# Patient Record
Sex: FEMALE | Race: WHITE | NOT HISPANIC OR LATINO | Employment: FULL TIME | URBAN - METROPOLITAN AREA
[De-identification: names, ages, dates, MRNs, and addresses within clinical notes are randomized per-mention and may not be internally consistent; named-entity substitution may affect disease eponyms.]

---

## 2018-06-20 ENCOUNTER — OFFICE VISIT (OUTPATIENT)
Dept: ORTHOPEDICS | Facility: CLINIC | Age: 28
End: 2018-06-20
Payer: COMMERCIAL

## 2018-06-20 VITALS
SYSTOLIC BLOOD PRESSURE: 122 MMHG | BODY MASS INDEX: 23.07 KG/M2 | WEIGHT: 147 LBS | HEART RATE: 60 BPM | DIASTOLIC BLOOD PRESSURE: 78 MMHG | HEIGHT: 67 IN

## 2018-06-20 DIAGNOSIS — M25.561 RECURRENT PAIN OF RIGHT KNEE: Primary | ICD-10-CM

## 2018-06-20 NOTE — MR AVS SNAPSHOT
"              After Visit Summary   2018    Yany Tolliver    MRN: [id number]           Patient Information     Date Of Birth          1990        Visit Information        Provider Department      2018 3:10 PM Anthony Castano MD [facility]        Today's Diagnoses     Recurrent pain of right knee    -  1       Follow-ups after your visit        Future tests that were ordered for you today     Open Future Orders        Priority Expected Expires Ordered    MR Knee Right w/o Contrast Routine  2019            Who to contact     Please call your clinic at [phone] to:    Ask questions about your health    Make or cancel appointments    Discuss your medicines    Learn about your test results    Speak to your doctor            Additional Information About Your Visit        MyChart Information     [employer] is an electronic gateway that provides easy, online access to your medical records. With [employer], you can request a clinic appointment, read your test results, renew a prescription or communicate with your care team.     To sign up for [employer] visit the website at [email or web address]   You will be asked to enter the access code listed below, as well as some personal information. Please follow the directions to create your username and password.     Your access code is: [email or web address]  Expires: 2018  3:42 PM     Your access code will  in 90 days. If you need help or a new code, please contact your [facility] or call [phone] for assistance.        Care EveryWhere ID     This is your Care EveryWhere ID. This could be used by other organizations to access your [facility] medical records  [id number]        Your Vitals Were     Pulse Height BMI (Body Mass Index)             60 5' 7\" (1.702 m) 23.02 kg/m2          Blood Pressure from Last 3 Encounters:   18 122/78    Weight from Last 3 " Encounters:   06/20/18 147 lb (66.7 kg)               Primary Care Provider    None Specified       No primary provider on file.        Equal Access to Services     MEGAN ROSADO : Hadii aad ku hadmicheleesau Nanec, waeldamaxim lariosmartinezha, jazielgeo momattmaxim mirandakandismaxim, ivan samirin hayaajyoti mirandanany yoo laGeoffjimmie higuera. So [facility] [phone].    ATENCIÓN: Si habla español, tiene a evangelista disposición servicios gratuitos de asistencia lingüística. Llame al [phone].    We comply with applicable federal civil rights laws and [place] laws. We do not discriminate on the basis of race, color, national origin, age, disability, sex, sexual orientation, or gender identity.            Thank you!     Thank you for choosing [facility]  for your care. Our goal is always to provide you with excellent care. Hearing back from our patients is one way we can continue to improve our services. Please take a few minutes to complete the written survey that you may receive in the mail after your visit with us. Thank you!             Your Updated Medication List - Protect others around you: Learn how to safely use, store and throw away your medicines at www.disposemymeds.org.      Notice  As of 6/20/2018  3:42 PM    You have not been prescribed any medications.

## 2018-06-20 NOTE — PROGRESS NOTES
SPORTS & ORTHOPEDIC WALK-IN VISIT 6/20/2018    Primary Care Physician:      Around March she was playing in a soccer match and twister her knee. She was told she had an ACL sprain. She did her own PT, iced, and mostly recovered. Last night in a soccer match her twisted her knee/planted her foot and felt a pop in the same knee. She was unable to finish the match and has had pain since then.     Reason for visit:     What part of your body is injured / painful?  right knee    What caused the injury /pain? Recreational/competitive sports injury - Soccer    How long ago did your injury occur or pain begin? yesterday    What are your most bothersome symptoms? Pain    How would you characterize your symptom?  sharp    What makes your symptoms better? Rest    What makes your symptoms worse? Standing, Walking and Bending    Have you been previously seen for this problem? No    Medical History:    Any recent changes to your medical history? No    Any new medication prescribed since last visit? No    Have you had surgery on this body part before? 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 No    Do you have any vision problems? No    Do you have any chest pain or edema? No    Do you have any shortness of breath or wheezing?  No    Do you have stomach problems? No    Do you have any numbness or focal weakness? No    Do you have diabetes? No    Do you have problems with bleeding or clotting? No    Do you have an rashes or other skin lesions? No       Subjective  Yany Tolliver is a 27 year old female who presents with the complaint of R knee pain  Had a ACL sprain in March with ACL injury described as a few fibers in March 2018. She was seen at [facility] and rehab recommended. She did dome home exercises and progressed to soccer before stepping and twisting her R knee with a pop last  "night. She had pain and had to stop. She had minimal swelling and in giving way. She iced it and is here for evaluation.      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       No Known Allergies  No current outpatient prescriptions on file.         Family Hx remarkable for   Yany Tolliver does not use tobacco      ROS:  Constitutional no fevers sweats or chills  Eyes no vision change  Respiratory, no sob cough wheezing,   Cardiovascular no syncope, chest pain or palpitaitons,   Gastroenterology, no nausea, vomiting or abd pain, no stool incontinence  Genitourinary, no dysuria, no  Frequency, no urinary incontinence, no urinary retention  Integumentary-no recent rashes  Musculoskeletal see HPI otherwise negative  Psychiatric no depression or anxiety  Heme-no abnormal bleeding      Objective  /78  Pulse 60  Ht 5' 7\" (1.702 m)  Wt 147 lb (66.7 kg)  BMI 23.02 kg/m2  Gen     right  Knee Exam  small joint effusion, No redness;Tenderness lateral/posterior joint line; ROM flexion limited to 90 deg with pain, Strength 5/5 ext, 5/5 flexion;No pain or opening with varus or valgus stress test; no increased translation with Lachmans; painful  McMurrays painful; unable to Squat test; Neg patellar apprehension test, Neg pseudocompression test, hip IR/ER did not cause pain    Assessment  R knee injury  --exam suggestive of mensical tear or chondral injury.  ACL tear could be considered but little swelling at 24 hours and no increased translation, though painful exam.     Plan  We discussed options including PRICE, ROM and recheck in 2 weeks once pain improves vs. Advanced imaging. She opts to image to know the extent of her injury and would consider all options depending on the injury. I do not believe this is a fx or ACL tear, therefore MRI ordered to evalutate. MRI and then f/u to review results    Anthony Castano MD " CAQ        Anthony Castano MD CAQ

## 2018-06-20 NOTE — LETTER
6/20/2018       RE: Yany Tolliver  [address] [place] [address]     Dear Colleague,    Thank you for referring your patient, Yany Tolliver, to the [facility] at [facility]. Please see a copy of my visit note below.          SPORTS & ORTHOPEDIC WALK-IN VISIT 6/20/2018    Primary Care Physician:      Around March she was playing in a soccer match and twister her knee. She was told she had an ACL sprain. She did her own PT, iced, and mostly recovered. Last night in a soccer match her twisted her knee/planted her foot and felt a pop in the same knee. She was unable to finish the match and has had pain since then.     Reason for visit:     What part of your body is injured / painful?  right knee    What caused the injury /pain? Recreational/competitive sports injury - Soccer    How long ago did your injury occur or pain begin? yesterday    What are your most bothersome symptoms? Pain    How would you characterize your symptom?  sharp    What makes your symptoms better? Rest    What makes your symptoms worse? Standing, Walking and Bending    Have you been previously seen for this problem? No    Medical History:    Any recent changes to your medical history? No    Any new medication prescribed since last visit? No    Have you had surgery on this body part before? 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 No    Do you have any vision problems? No    Do you have any chest pain or edema? No    Do you have any shortness of breath or wheezing?  No    Do you have stomach problems? No    Do you have any numbness or focal weakness? No    Do you have diabetes? No    Do you have problems with bleeding or clotting? No    Do you have an rashes or other skin lesions? No       Subjective  Yany  "Omaira Tolliver is a 27 year old female who presents with the complaint of R knee pain  Had a ACL sprain in March with ACL injury described as a few fibers in March 2018. She was seen at [facility] and rehab recommended. She did dome home exercises and progressed to soccer before stepping and twisting her R knee with a pop last night. She had pain and had to stop. She had minimal swelling and in giving way. She iced it and is here for evaluation.      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       No Known Allergies  No current outpatient prescriptions on file.         Family Hx remarkable for   Yany Tolliver does not use tobacco      ROS:  Constitutional no fevers sweats or chills  Eyes no vision change  Respiratory, no sob cough wheezing,   Cardiovascular no syncope, chest pain or palpitaitons,   Gastroenterology, no nausea, vomiting or abd pain, no stool incontinence  Genitourinary, no dysuria, no  Frequency, no urinary incontinence, no urinary retention  Integumentary-no recent rashes  Musculoskeletal see HPI otherwise negative  Psychiatric no depression or anxiety  Heme-no abnormal bleeding      Objective  /78  Pulse 60  Ht 5' 7\" (1.702 m)  Wt 147 lb (66.7 kg)  BMI 23.02 kg/m2  Gen     right  Knee Exam  small joint effusion, No redness;Tenderness lateral/posterior joint line; ROM flexion limited to 90 deg with pain, Strength 5/5 ext, 5/5 flexion;No pain or opening with varus or valgus stress test; no increased translation with Lachmans; painful  McMurrays painful; unable to Squat test; Neg patellar apprehension test, Neg pseudocompression test, hip IR/ER did not cause pain    Assessment  R knee injury  --exam suggestive of mensical tear or chondral injury.  ACL tear could be considered but little swelling at 24 hours and no increased translation, though painful exam.     Plan  We discussed options including " PRICE, ROM and recheck in 2 weeks once pain improves vs. Advanced imaging. She opts to image to know the extent of her injury and would consider all options depending on the injury. I do not believe this is a fx or ACL tear, therefore MRI ordered to evalutate. MRI and then f/u to review results    Anthony Castano MD CAQ

## 2018-06-28 ENCOUNTER — TELEPHONE (OUTPATIENT)
Dept: ORTHOPEDICS | Facility: CLINIC | Age: 28
End: 2018-06-28

## 2018-06-28 NOTE — TELEPHONE ENCOUNTER
Pt was called back in response to a VM left on the walk-in phone. She wasn't sure where the clinic that she is to see Dr. Castano in on Monday is. I informed her that it is in the same building as the walk-in clinic, but on the fifth floor. She verbalized understanding. We confirmed the time of the appointment. She had no further questions.

## 2018-07-02 ENCOUNTER — OFFICE VISIT (OUTPATIENT)
Dept: ORTHOPEDICS | Facility: CLINIC | Age: 28
End: 2018-07-02
Payer: COMMERCIAL

## 2018-07-02 VITALS — BODY MASS INDEX: 23.07 KG/M2 | RESPIRATION RATE: 16 BRPM | HEIGHT: 67 IN | WEIGHT: 147 LBS

## 2018-07-02 DIAGNOSIS — M25.561 ACUTE PAIN OF RIGHT KNEE: Primary | ICD-10-CM

## 2018-07-02 NOTE — MR AVS SNAPSHOT
After Visit Summary   7/2/2018    Yany Tolliver    MRN: [id number]           Patient Information     Date Of Birth          1990        Visit Information        Provider Department      7/2/2018 5:30 PM Anthony Castano MD [facility]        Today's Diagnoses     Acute pain of right knee    -  1       Follow-ups after your visit        Additional Services     PHYSICAL THERAPY REFERRAL (Internal)       Physical Therapy Referral                  Your next 10 appointments already scheduled     Jul 11, 2018  3:00 PM CDT   (Arrive by 2:45 PM)   ENEIDA Extremity with Jessi Villagomez, PT   [facility] [place] ([facility])    [address] [place] [address] [id number] [phone]            Jul 20, 2018 10:10 AM CDT   ENEIDA Extremity with Tamy Rios PT   [facility] [place] ([facility])    [address] [place] [address] [id number] [phone]            Jul 24, 2018  2:10 PM CDT   ENEIDA Extremity with Tamy Rios, PT   [facility] [place] ([facility])    [address] [place] [address] [id number] [phone]            Aug 01, 2018 12:00 PM CDT   (Arrive by 11:45 AM)   Return Visit with Anthony Castano MD   [facility] ([facility])    [address] [place] [address] [phone]              Who to contact     Please call your clinic at [phone] to:    Ask questions about your health    Make or cancel appointments    Discuss your medicines    Learn about your test results    Speak to your doctor            Additional Information About Your Visit        YouAppihart Information     [employer] is an electronic gateway that provides easy, online access to your medical records. With [employer], you can request a clinic appointment, read  "your test results, renew a prescription or communicate with your care team.     To sign up for "[employer]hart visit the website at [email or web address]   You will be asked to enter the access code listed below, as well as some personal information. Please follow the directions to create your username and password.     Your access code is: [email or web address]  Expires: 2018  3:42 PM     Your access code will  in 90 days. If you need help or a new code, please contact your [facility] or call [phone] for assistance.        Care EveryWhere ID     This is your Care EveryWhere ID. This could be used by other organizations to access your [place] medical records  [id number]        Your Vitals Were     Respirations Height BMI (Body Mass Index)             16 5' 7\" (1.702 m) 23.02 kg/m2          Blood Pressure from Last 3 Encounters:   18 122/78    Weight from Last 3 Encounters:   18 147 lb (66.7 kg)   18 147 lb (66.7 kg)              We Performed the Following     PHYSICAL THERAPY REFERRAL (Internal)        Primary Care Provider    None Specified       No primary provider on file.        Equal Access to Services     JENNA ROSADO : Hadii diya Nance, waeldada mary, qafranciscata kaalmada misael, ivan boogie . So [facility] [phone].    ATENCIÓN: Si habla español, tiene a evangelista disposición servicios gratuitos de asistencia lingüística. Llame al [phone].    We comply with applicable federal civil rights laws and [place] laws. We do not discriminate on the basis of race, color, national origin, age, disability, sex, sexual orientation, or gender identity.            Thank you!     Thank you for choosing [facility]  for your care. Our goal is always to provide you with excellent care. Hearing back from our patients is one way we can continue to improve our services. Please take a few minutes to complete the written " survey that you may receive in the mail after your visit with us. Thank you!             Your Updated Medication List - Protect others around you: Learn how to safely use, store and throw away your medicines at www.disposemymeds.org.      Notice  As of 7/2/2018 11:59 PM    You have not been prescribed any medications.

## 2018-07-02 NOTE — LETTER
Date:July 6, 2018      Patient was self referred, no letter generated. Do not send.        [facility] Information

## 2018-07-02 NOTE — PROGRESS NOTES
" Subjective:   Yany Tolliver is a 27 year old female who is here following up recurrent pain of right knee. She is wanting to try physical therapy.  Her pain has improved. She improved significantly after a few days. She can walk normally and is back to where she was with more pain. Pain onl    Date of injury: 3/18  Date last seen: Visit date not found  Following Therapeutic Plan:  Hasn't done MRI   Pain: Improving  Function: Improving  Interval History:      PAST MEDICAL, SOCIAL, SURGICAL AND FAMILY HISTORY: She  has no past medical history on file.  She  has no past surgical history on file.  Her family history is not on file.  She reports that she has never smoked. She has never used smokeless tobacco.    ALLERGIES: She has No Known Allergies.    CURRENT MEDICATIONS: She currently has no medications in their medication list.     REVIEW OF SYSTEMS: 3 point review of systems is negative except as noted above.     Exam:   Resp 16  Ht 5' 7\" (1.702 m)  Wt 147 lb (66.7 kg)  BMI 23.02 kg/m2        CONSTITUTIONAL: healthy, alert and no distress  HEAD: Normocephalic. No masses, lesions, tenderness or abnormalities  SKIN: no suspicious lesions or rashes  GAIT: normal  NEUROLOGIC: Non-focal  PSYCHIATRIC: affect normal/bright and mentation appears normal.    MUSCULOSKELETAL:     Right Knee Exam  No joint effusion, No redness;Tenderness nontender at the joint lines today;ROM improved with full ext and pain with flexion past 120 deg;   Strength 5/5 ext, 5/5 flexion;No pain or opening with varus or valgus stress test; Neg Lachmans; Neg McMurrays; painful Squat test; Neg patellar apprehension test, Neg pseudocompression test, hip IR/ER did not cause pain  Able to single leg stand and single leg hop       Assessment/Plan:   R knee injury  -- initial pop and pain without effusion  -- previous hx of low grade ACL sprain March 2018. She under-rehabbed this and then reinjured the knee. Differential was incomplete resprain " vs. mensical tear or pf pain and subluxation with pop.    --stable on exam, some medial pain with flexion. We discussed option of MRI for more knowledge but she declines in part due to cost and in part because her knee is quickly improving.  We discussed PT referral for ROM and strengthening and progression from walking to jogging to straight ahead running and lifting no more than body weight. Recheck in 1 month for progression. Can call for R knee MRI if symptoms worsen or swelling.    Anthony Castano MD CAQ

## 2018-07-02 NOTE — LETTER
"  7/2/2018      RE: Yany Tolliver  [address] [place] [address]        Subjective:   Yany Tolliver is a 27 year old female who is here following up recurrent pain of right knee. She is wanting to try physical therapy.  Her pain has improved. She improved significantly after a few days. She can walk normally and is back to where she was with more pain. Pain onl    Date of injury: 3/18  Date last seen: Visit date not found  Following Therapeutic Plan:  Hasn't done MRI   Pain: Improving  Function: Improving  Interval History:      PAST MEDICAL, SOCIAL, SURGICAL AND FAMILY HISTORY: She  has no past medical history on file.  She  has no past surgical history on file.  Her family history is not on file.  She reports that she has never smoked. She has never used smokeless tobacco.    ALLERGIES: She has No Known Allergies.    CURRENT MEDICATIONS: She currently has no medications in their medication list.     REVIEW OF SYSTEMS: 3 point review of systems is negative except as noted above.     Exam:   Resp 16  Ht 5' 7\" (1.702 m)  Wt 147 lb (66.7 kg)  BMI 23.02 kg/m2        CONSTITUTIONAL: healthy, alert and no distress  HEAD: Normocephalic. No masses, lesions, tenderness or abnormalities  SKIN: no suspicious lesions or rashes  GAIT: normal  NEUROLOGIC: Non-focal  PSYCHIATRIC: affect normal/bright and mentation appears normal.    MUSCULOSKELETAL:     Right Knee Exam  No joint effusion, No redness;Tenderness nontender at the joint lines today;ROM improved with full ext and pain with flexion past 120 deg;   Strength 5/5 ext, 5/5 flexion;No pain or opening with varus or valgus stress test; Neg Lachmans; Neg McMurrays; painful Squat test; Neg patellar apprehension test, Neg pseudocompression test, hip IR/ER did not cause pain  Able to single leg stand and single leg hop       Assessment/Plan:   R knee injury  -- initial pop and pain without effusion  -- previous hx of low grade ACL sprain March 2018. " She under-rehabbed this and then reinjured the knee. Differential was incomplete resprain vs. mensical tear or pf pain and subluxation with pop.    --stable on exam, some medial pain with flexion. We discussed option of MRI for more knowledge but she declines in part due to cost and in part because her knee is quickly improving.  We discussed PT referral for ROM and strengthening and progression from walking to jogging to straight ahead running and lifting no more than body weight. Recheck in 1 month for progression. Can call for R knee MRI if symptoms worsen or swelling.    Anthony Castano MD CAQ      Anthony Castano MD

## 2018-07-11 ENCOUNTER — THERAPY VISIT (OUTPATIENT)
Dept: PHYSICAL THERAPY | Facility: CLINIC | Age: 28
End: 2018-07-11
Payer: COMMERCIAL

## 2018-07-11 DIAGNOSIS — M25.561 RIGHT KNEE PAIN: Primary | ICD-10-CM

## 2018-07-11 PROCEDURE — 97110 THERAPEUTIC EXERCISES: CPT | Mod: GP | Performed by: PHYSICAL THERAPIST

## 2018-07-11 PROCEDURE — 97161 PT EVAL LOW COMPLEX 20 MIN: CPT | Mod: GP | Performed by: PHYSICAL THERAPIST

## 2018-07-11 ASSESSMENT — ACTIVITIES OF DAILY LIVING (ADL)
HOW_WOULD_YOU_RATE_THE_OVERALL_FUNCTION_OF_YOUR_KNEE_DURING_YOUR_USUAL_DAILY_ACTIVITIES?: NOT ANSWERED
AS_A_RESULT_OF_YOUR_KNEE_INJURY,_HOW_WOULD_YOU_RATE_YOUR_CURRENT_LEVEL_OF_DAILY_ACTIVITY?: NOT ANSWERED
SIT WITH YOUR KNEE BENT: NOT ANSWERED
RISE FROM A CHAIR: NOT ANSWERED
STAND: NOT ANSWERED
KNEEL ON THE FRONT OF YOUR KNEE: NOT ANSWERED
SQUAT: NOT ANSWERED
GIVING WAY, BUCKLING OR SHIFTING OF KNEE: I HAVE THE SYMPTOM BUT IT DOES NOT AFFECT MY ACTIVITY
PAIN: THE SYMPTOM AFFECTS MY ACTIVITY SLIGHTLY
LIMPING: I HAVE THE SYMPTOM BUT IT DOES NOT AFFECT MY ACTIVITY
GO DOWN STAIRS: NOT ANSWERED
KNEE_ACTIVITY_OF_DAILY_LIVING_SUM: 22
GO UP STAIRS: NOT ANSWERED
WALK: NOT ANSWERED
WEAKNESS: THE SYMPTOM AFFECTS MY ACTIVITY MODERATELY
SWELLING: I DO NOT HAVE THE SYMPTOM
STIFFNESS: I DO NOT HAVE THE SYMPTOM

## 2018-07-11 NOTE — MR AVS SNAPSHOT
After Visit Summary   7/11/2018    Yany Tolliver    MRN: [id number]           Patient Information     Date Of Birth          1990        Visit Information        Provider Department      7/11/2018 3:00 PM Jessi Villagomez PT [facility] [place]        Today's Diagnoses     Right knee pain    -  1       Follow-ups after your visit        Your next 10 appointments already scheduled     Jul 24, 2018  2:10 PM CDT   ENE[place] Extremity with Tamy Rios PT   [facility] [place] ([facility])    [address] [place] [id number] [phone]            Jul 31, 2018  3:30 PM CDT   ENEIDA Extremity with Tamy Rios PT   [facility] [place] ([facility])    [address] [place] [id number] [phone]            Aug 01, 2018 12:00 PM CDT   (Arrive by 11:45 AM)   Return Visit with Anthony Castano MD   [facility] ([facility])    [address] [place] [id number] [phone]              Who to contact     If you have questions or need follow up information about today's clinic visit or your schedule please contact [facility] PHYSICAL THERAPY [place] directly at [phone].  Normal or non-critical lab and imaging results will be communicated to you by MyChart, letter or phone within 4 business days after the clinic has received the results. If you do not hear from us within 7 days, please contact the clinic through MyChart or phone. If you have a critical or abnormal lab result, we will notify you by phone as soon as possible.  Submit refill requests through [employer] or call your pharmacy and they will forward the refill request to us. Please allow 3 business days for your refill to be completed.          Additional Information About Your Visit        MyChart Information     [employer] lets you  "send messages to your doctor, view your test results, renew your prescriptions, schedule appointments and more. To sign up, go to [email or web address] . Click on \"Log in\" on the left side of the screen, which will take you to the Welcome page. Then click on \"Sign up Now\" on the right side of the page.     You will be asked to enter the access code listed below, as well as some personal information. Please follow the directions to create your username and password.     Your access code is: [email or web address]  Expires: 2018  3:42 PM     Your access code will  in 90 days. If you need help or a new code, please call your [place] clinic or [phone].        Care EveryWhere ID     This is your Care EveryWhere ID. This could be used by other organizations to access your [place] medical records  [id number]         Blood Pressure from Last 3 Encounters:   18 122/78    Weight from Last 3 Encounters:   18 66.7 kg (147 lb)   18 66.7 kg (147 lb)              We Performed the Following     HC PT EVAL, LOW COMPLEXITY     [place] INITIAL EVAL REPORT     THERAPEUTIC EXERCISES        Primary Care Provider Fax #    Physician No Ref-Primary [phone]       No address on file        Equal Access to Services     MEGAN ROSADO : Sabrina Nance, waaxda luqadaha, qaybta kaalmada adeegyada, ivan boogie . So [facility] [phone].    ATENCIÓN: Si habla español, tiene a evangelista disposición servicios gratuitos de asistencia lingüística. Llame al [phone].    We comply with applicable federal civil rights laws and [place] laws. We do not discriminate on the basis of race, color, national origin, age, disability, sex, sexual orientation, or gender identity.            Thank you!     Thank you for choosing [facility] PHYSICAL THERAPY [place]  for your care. Our goal is always to provide you with excellent care. Hearing back from our patients is one way we can continue to improve our " services. Please take a few minutes to complete the written survey that you may receive in the mail after your visit with us. Thank you!             Your Updated Medication List - Protect others around you: Learn how to safely use, store and throw away your medicines at www.disposemymeds.org.      Notice  As of 7/11/2018  4:39 PM    You have not been prescribed any medications.

## 2018-07-11 NOTE — PROGRESS NOTES
[facility] Initial Evaluation  Subjective:  Patient is a 27 year old female presenting with rehab right knee hpi. The history is provided by the patient. No  was used.   Yany Tolliver is a 27 year old female with a right knee condition.      This is a new condition  Pt injured her R knee while playing soccer in march; pt twisted and landed wrong on her foot, she was diagnosed mild ACL tear, she performed exercises which helped, went back to play in June and reinjured her knee, she was playing with her brace on when she twisted it and felt a pop, mild swelling after a few hours; pt iced and rested it all week which helped; on 7/2/18 MD recommended PT ?meniscal injury  .    Patient reports pain:  Posterior and medial.    Pain is described as aching and is intermittent and reported as 2/10.  Associated symptoms:  Loss of strength and buckling/giving out. Pain is worse during the day.  Symptoms are exacerbated by walking and bending/squatting (walknig ariana 3/4 of a mile) and relieved by ice.  Since onset symptoms are gradually improving.        General health as reported by patient is good.  Pertinent medical history includes:  Anemia, history of fractures and migraines/headaches.        Current occupation is barista  .    Primary job tasks include:  Prolonged standing.                                Objective:  System                                           Hip Evaluation    Hip Strength:    Flexion:   Right: 4+/5   Pain:                    Extension:  Left: 5-/5  Pain:Right: 4/5    Pain:    Abduction:  Left: 4+/5     Pain:Right: 4/5    Pain:      External Rotation:  Left: 5-/5   Pain:  Right: 4+/5   Pain:                     Knee Evaluation:  ROM:    AROM    Hyperextension:  Left:  0    Right: 0  Extension:  Left: 0    Right:  0  Flexion: Left: wnl    Right: min loss  PROM    Hyperextension: Left: 20    Right:  10  Extension: Left: 0    Right:  0  Flexion: Left: 140     Right:  130      Strength:     Extension:  Left: 5/5   Pain:      Right: 5/5   Pain:  Flexion:  Left: 5/5   Pain:      Right: 5/5   Pain:    Quad Set Left: WNL    Pain:   Quad Set Right: WNL    Pain:      Palpation:      Right knee tenderness present at:  Medial Joint Line and Semitendinosus      Functional Testing:          Quad:    Single Leg Squat:  Left:         Mild loss of control and femoral IR  Right:      Painful  Moderate loss of control and femoral IR  Bilateral Leg Squat:  R side pain  Normal control              General     ROS    Assessment/Plan:    Patient is a 27 year old female with right side knee complaints.    Patient has the following significant findings with corresponding treatment plan.                Diagnosis 1:  R knee pain   Pain -  hot/cold therapy, self management, education, directional preference exercise and home program  Decreased ROM/flexibility - manual therapy, therapeutic exercise and therapeutic activity  Decreased joint mobility - manual therapy, therapeutic exercise and home program  Decreased strength - therapeutic exercise, therapeutic activities and home program  Decreased proprioception - neuro re-education and therapeutic activities  Decreased function - therapeutic activities and home program    Therapy Evaluation Codes:   1) History comprised of:   Personal factors that impact the plan of care:      None.    Comorbidity factors that impact the plan of care are:      Migraines/headaches.     Medications impacting care: None.  2) Examination of Body Systems comprised of:   Body structures and functions that impact the plan of care:      Knee.   Activity limitations that impact the plan of care are:      Squatting/kneeling.  3) Clinical presentation characteristics are:   Stable/Uncomplicated.  4) Decision-Making    Low complexity using standardized patient assessment instrument and/or measureable assessment of functional outcome.  Cumulative Therapy Evaluation is: Low  complexity.    Previous and current functional limitations:  (See Goal Flow Sheet for this information)    Short term and Long term goals: (See Goal Flow Sheet for this information)     Communication ability:  Patient appears to be able to clearly communicate and understand verbal and written communication and follow directions correctly.  Treatment Explanation - The following has been discussed with the patient:   RX ordered/plan of care  Anticipated outcomes  Possible risks and side effects  This patient would benefit from PT intervention to resume normal activities.   Rehab potential is excellent.    Frequency:  3 X a month, once daily  Duration:  for 2 months  Discharge Plan:  Achieve all LTG.  Independent in home treatment program.  Return to previous functional level by discharge.  Reach maximal therapeutic benefit.    Please refer to the daily flowsheet for treatment today, total treatment time and time spent performing 1:1 timed codes.

## 2018-07-24 ENCOUNTER — THERAPY VISIT (OUTPATIENT)
Dept: PHYSICAL THERAPY | Facility: CLINIC | Age: 28
End: 2018-07-24
Payer: COMMERCIAL

## 2018-07-24 DIAGNOSIS — M25.561 RIGHT KNEE PAIN, UNSPECIFIED CHRONICITY: ICD-10-CM

## 2018-07-24 PROCEDURE — 97110 THERAPEUTIC EXERCISES: CPT | Mod: GP | Performed by: PHYSICAL THERAPIST

## 2018-07-24 PROCEDURE — 97140 MANUAL THERAPY 1/> REGIONS: CPT | Mod: GP | Performed by: PHYSICAL THERAPIST

## 2018-07-24 PROCEDURE — 97530 THERAPEUTIC ACTIVITIES: CPT | Mod: GP | Performed by: PHYSICAL THERAPIST

## 2018-07-31 ENCOUNTER — THERAPY VISIT (OUTPATIENT)
Dept: PHYSICAL THERAPY | Facility: CLINIC | Age: 28
End: 2018-07-31
Payer: COMMERCIAL

## 2018-07-31 DIAGNOSIS — M25.561 RIGHT KNEE PAIN, UNSPECIFIED CHRONICITY: ICD-10-CM

## 2018-07-31 PROCEDURE — 97140 MANUAL THERAPY 1/> REGIONS: CPT | Mod: GP | Performed by: PHYSICAL THERAPIST

## 2018-07-31 PROCEDURE — 97530 THERAPEUTIC ACTIVITIES: CPT | Mod: GP | Performed by: PHYSICAL THERAPIST

## 2018-08-16 ENCOUNTER — THERAPY VISIT (OUTPATIENT)
Dept: PHYSICAL THERAPY | Facility: CLINIC | Age: 28
End: 2018-08-16
Payer: COMMERCIAL

## 2018-08-16 DIAGNOSIS — M25.561 RIGHT KNEE PAIN, UNSPECIFIED CHRONICITY: ICD-10-CM

## 2018-08-16 PROCEDURE — 97530 THERAPEUTIC ACTIVITIES: CPT | Mod: GP | Performed by: PHYSICAL THERAPIST

## 2018-08-16 PROCEDURE — 97140 MANUAL THERAPY 1/> REGIONS: CPT | Mod: GP | Performed by: PHYSICAL THERAPIST

## 2018-08-16 ASSESSMENT — ACTIVITIES OF DAILY LIVING (ADL)
STAND: ACTIVITY IS NOT DIFFICULT
SWELLING: I DO NOT HAVE THE SYMPTOM
PAIN: THE SYMPTOM AFFECTS MY ACTIVITY SLIGHTLY
STIFFNESS: I DO NOT HAVE THE SYMPTOM
GO DOWN STAIRS: ACTIVITY IS NOT DIFFICULT
GO UP STAIRS: ACTIVITY IS NOT DIFFICULT
WALK: ACTIVITY IS NOT DIFFICULT
KNEEL ON THE FRONT OF YOUR KNEE: ACTIVITY IS FAIRLY DIFFICULT
KNEE_ACTIVITY_OF_DAILY_LIVING_SUM: 63
RAW_SCORE: 63
LIMPING: I DO NOT HAVE THE SYMPTOM
SIT WITH YOUR KNEE BENT: ACTIVITY IS NOT DIFFICULT
GIVING WAY, BUCKLING OR SHIFTING OF KNEE: I DO NOT HAVE THE SYMPTOM
AS_A_RESULT_OF_YOUR_KNEE_INJURY,_HOW_WOULD_YOU_RATE_YOUR_CURRENT_LEVEL_OF_DAILY_ACTIVITY?: NORMAL
HOW_WOULD_YOU_RATE_THE_OVERALL_FUNCTION_OF_YOUR_KNEE_DURING_YOUR_USUAL_DAILY_ACTIVITIES?: NEARLY NORMAL
KNEE_ACTIVITY_OF_DAILY_LIVING_SCORE: 90
WEAKNESS: THE SYMPTOM AFFECTS MY ACTIVITY SLIGHTLY
RISE FROM A CHAIR: ACTIVITY IS NOT DIFFICULT
HOW_WOULD_YOU_RATE_THE_CURRENT_FUNCTION_OF_YOUR_KNEE_DURING_YOUR_USUAL_DAILY_ACTIVITIES_ON_A_SCALE_FROM_0_TO_100_WITH_100_BEING_YOUR_LEVEL_OF_KNEE_FUNCTION_PRIOR_TO_YOUR_INJURY_AND_0_BEING_THE_INABILITY_TO_PERFORM_ANY_OF_YOUR_USUAL_DAILY_ACTIVITIES?: 90
SQUAT: ACTIVITY IS NOT DIFFICULT

## 2018-08-30 ENCOUNTER — THERAPY VISIT (OUTPATIENT)
Dept: PHYSICAL THERAPY | Facility: CLINIC | Age: 28
End: 2018-08-30
Payer: COMMERCIAL

## 2018-08-30 DIAGNOSIS — M25.561 RIGHT KNEE PAIN, UNSPECIFIED CHRONICITY: ICD-10-CM

## 2018-08-30 PROCEDURE — 97112 NEUROMUSCULAR REEDUCATION: CPT | Mod: GP | Performed by: PHYSICAL THERAPIST

## 2018-08-30 PROCEDURE — 97110 THERAPEUTIC EXERCISES: CPT | Mod: GP | Performed by: PHYSICAL THERAPIST

## 2018-08-30 PROCEDURE — 97530 THERAPEUTIC ACTIVITIES: CPT | Mod: GP | Performed by: PHYSICAL THERAPIST

## 2018-08-30 NOTE — PROGRESS NOTES
2D Video Running Gait Analysis   Reproduction of  Sports [facility] 2D Video Analysis    Alex Sher PT, PhD 2015     SAGITTAL PLANE OBSERVATIONS  Variable Right Left   Foot Strike Pattern Heel strike Heel strike   IC Tibial Inclination Angle (within 5  of vertical) Mild inclination Mild inclination   IC KF Angle (~20 ) Mild decrease Mild decrease   MS KF Angle (~40 ) Decreased Decreased   MS Ankle DF Angle   (knee over toes) Appropriate Appropriate   Push-off Hip Ext Angle (0-5 ) Appropriate Appropriate   Anterior Pelvic Tilt (5-10 ) Appropriate Appropriate   Lumbar Lordosis Appropriate Appropriate   COM Excursion (6-8 cm) Appropriate Appropriate     Forward Trunk Lean (5-10  forward) Decreased       FRONTAL PLANE OBSERVATIONS  Variable Right Left   Trunk Side Bend (vertical) Appropriate Appropriate   Lateral Pelvic Drop   (males 3-5 , females 4-7 ) Appropriate Excessive contralateral   Knee Center Position (midline) Mild medial Appropriate   Knee Separation   (slight separation) Narrow Narrow   Foot-COM Position   (speed dependent) Appropriate Appropriate   Rearfoot Position   (neutral or mild pronation) Pronated Pronated   Forefoot Position   (neutral or mild abduction) Appropriate Appropriate     Other Observations  Trunk Rotation Appropriate   Arm Swing Right arm held more in abduction   Heel Height (symmetrical) Appropriate     Harmony: 160  Symmetrical sound of loading

## 2018-08-30 NOTE — MR AVS SNAPSHOT
After Visit Summary   8/30/2018    Yany Tolliver    MRN: [id number]           Patient Information     Date Of Birth          1990        Visit Information        Provider Department      8/30/2018 11:00 AM Tamy Rios PT [facility] [place]        Today's Diagnoses     Right knee pain, unspecified chronicity           Follow-ups after your visit        Your next 10 appointments already scheduled     Sep 18, 2018  1:30 PM CDT   [facility] Extremity with [facility] ([facility])    [address] [place] [address] [phone]            Oct 03, 2018 10:50 AM CDT   ENEIDA Extremity with CAREN Garcia [facility] [place] ([facility])    [address] [place] [address] [phone]              Who to contact     If you have questions or need follow up information about today's clinic visit or your schedule please contact [facility] PHYSICAL THERAPY [place] directly at [phone].  Normal or non-critical lab and imaging results will be communicated to you by MyChart, letter or phone within 4 business days after the clinic has received the results. If you do not hear from us within 7 days, please contact the clinic through MyChart or phone. If you have a critical or abnormal lab result, we will notify you by phone as soon as possible.  Submit refill requests through [employer] or call your pharmacy and they will forward the refill request to us. Please allow 3 business days for your refill to be completed.          Additional Information About Your Visit        Care EveryWhere ID     This is your Care EveryWhere ID. This could be used by other organizations to access your [place] medical records  [id number]         Blood Pressure from Last 3 Encounters:   06/20/18 122/78    Weight from Last 3 Encounters:   07/02/18 66.7 kg  (147 lb)   06/20/18 66.7 kg (147 lb)              We Performed the Following     NEUROMUSCULAR RE-EDUCATION     THERAPEUTIC ACTIVITIES     THERAPEUTIC EXERCISES        Primary Care Provider Fax #    Physician No Ref-Primary [phone]       No address on file        Equal Access to Services     MEGAN ALONZO : Sabrina keane bk saad Nance, waaxda luqadaha, qaybta kaalmada adewalt, ivan yoo laGeoffjimmie higuera. So [facility] [phone].    ATENCIÓN: Si habla español, tiene a evangelista disposición servicios gratuitos de asistencia lingüística. Llame al [phone].    We comply with applicable federal civil rights laws and [place] laws. We do not discriminate on the basis of race, color, national origin, age, disability, sex, sexual orientation, or gender identity.            Thank you!     Thank you for choosing [facility] PHYSICAL THERAPY [facility]  for your care. Our goal is always to provide you with excellent care. Hearing back from our patients is one way we can continue to improve our services. Please take a few minutes to complete the written survey that you may receive in the mail after your visit with us. Thank you!             Your Updated Medication List - Protect others around you: Learn how to safely use, store and throw away your medicines at www.disposemymeds.org.      Notice  As of 8/30/2018  2:21 PM    You have not been prescribed any medications.

## 2018-09-18 ENCOUNTER — THERAPY VISIT (OUTPATIENT)
Dept: PHYSICAL THERAPY | Facility: CLINIC | Age: 28
End: 2018-09-18
Payer: COMMERCIAL

## 2018-09-18 DIAGNOSIS — M25.561 RIGHT KNEE PAIN, UNSPECIFIED CHRONICITY: ICD-10-CM

## 2018-09-18 PROCEDURE — 97110 THERAPEUTIC EXERCISES: CPT | Mod: GP | Performed by: PHYSICAL THERAPIST

## 2018-09-18 PROCEDURE — 97112 NEUROMUSCULAR REEDUCATION: CPT | Mod: GP | Performed by: PHYSICAL THERAPIST

## 2018-10-03 ENCOUNTER — THERAPY VISIT (OUTPATIENT)
Dept: PHYSICAL THERAPY | Facility: CLINIC | Age: 28
End: 2018-10-03
Payer: COMMERCIAL

## 2018-10-03 DIAGNOSIS — M25.561 RIGHT KNEE PAIN, UNSPECIFIED CHRONICITY: ICD-10-CM

## 2018-10-03 PROCEDURE — 97112 NEUROMUSCULAR REEDUCATION: CPT | Mod: GP | Performed by: PHYSICAL THERAPIST

## 2018-10-03 PROCEDURE — 97110 THERAPEUTIC EXERCISES: CPT | Mod: GP | Performed by: PHYSICAL THERAPIST

## 2018-10-03 PROCEDURE — 97530 THERAPEUTIC ACTIVITIES: CPT | Mod: GP | Performed by: PHYSICAL THERAPIST

## 2018-10-23 ENCOUNTER — THERAPY VISIT (OUTPATIENT)
Dept: PHYSICAL THERAPY | Facility: CLINIC | Age: 28
End: 2018-10-23
Payer: COMMERCIAL

## 2018-10-23 DIAGNOSIS — M25.561 RIGHT KNEE PAIN, UNSPECIFIED CHRONICITY: ICD-10-CM

## 2018-10-23 PROCEDURE — 97530 THERAPEUTIC ACTIVITIES: CPT | Mod: GP | Performed by: PHYSICAL THERAPIST

## 2018-10-23 PROCEDURE — 97110 THERAPEUTIC EXERCISES: CPT | Mod: GP | Performed by: PHYSICAL THERAPIST

## 2018-12-07 ENCOUNTER — THERAPY VISIT (OUTPATIENT)
Dept: PHYSICAL THERAPY | Facility: CLINIC | Age: 28
End: 2018-12-07
Payer: COMMERCIAL

## 2018-12-07 DIAGNOSIS — M25.561 RIGHT KNEE PAIN, UNSPECIFIED CHRONICITY: Primary | ICD-10-CM

## 2018-12-07 PROCEDURE — 97140 MANUAL THERAPY 1/> REGIONS: CPT | Mod: GP | Performed by: PHYSICAL THERAPIST

## 2018-12-07 PROCEDURE — 97530 THERAPEUTIC ACTIVITIES: CPT | Mod: GP | Performed by: PHYSICAL THERAPIST

## 2018-12-07 NOTE — MR AVS SNAPSHOT
After Visit Summary   12/7/2018    Yany Tolliver    MRN: [id number]           Patient Information     Date Of Birth          1990        Visit Information        Provider Department      12/7/2018 11:30 AM Tamy Rios PT [facility] Therapy [place]        Today's Diagnoses     Right knee pain, unspecified chronicity    -  1       Follow-ups after your visit        Your next 10 appointments already scheduled     Dec 26, 2018  2:40 PM CST   ENEIDA Extremity with CAREN Garcia [facility] [place] ([facility])    [address] [place] [address] [phone]              Who to contact     If you have questions or need follow up information about today's clinic visit or your schedule please contact [facility] PHYSICAL THERAPY [place] directly at [phone].  Normal or non-critical lab and imaging results will be communicated to you by MyChart, letter or phone within 4 business days after the clinic has received the results. If you do not hear from us within 7 days, please contact the clinic through MyChart or phone. If you have a critical or abnormal lab result, we will notify you by phone as soon as possible.  Submit refill requests through [employer] or call your pharmacy and they will forward the refill request to us. Please allow 3 business days for your refill to be completed.          Additional Information About Your Visit        Care EveryWhere ID     This is your Care EveryWhere ID. This could be used by other organizations to access your [place] medical records  [id number]         Blood Pressure from Last 3 Encounters:   06/20/18 122/78    Weight from Last 3 Encounters:   07/02/18 66.7 kg (147 lb)   06/20/18 66.7 kg (147 lb)              We Performed the Following     MANUAL THER TECH,1+REGIONS,EA 15 MIN     THERAPEUTIC ACTIVITIES        Primary Care Provider Fax #    Physician No Ref-Primary [phone]        No address on file        Equal Access to Services     [facility] ALONZO : Hadii aad ku hadmicheleesau Emilykeith, kimmaxim lariosmartinezha, jazielgeo momattmaxim douglas, ivan higuera. So [facility] [phone].    ATENCIÓN: Si habla español, tiene a eavngelista disposición servicios gratuitos de asistencia lingüística. Llame al [phone].    We comply with applicable federal civil rights laws and [place] laws. We do not discriminate on the basis of race, color, national origin, age, disability, sex, sexual orientation, or gender identity.            Thank you!     Thank you for choosing [facility] PHYSICAL THERAPY [facility]  for your care. Our goal is always to provide you with excellent care. Hearing back from our patients is one way we can continue to improve our services. Please take a few minutes to complete the written survey that you may receive in the mail after your visit with us. Thank you!             Your Updated Medication List - Protect others around you: Learn how to safely use, store and throw away your medicines at www.disposemymeds.org.      Notice  As of 12/7/2018  1:02 PM    You have not been prescribed any medications.

## 2018-12-07 NOTE — PROGRESS NOTES
Subjective:  HPI                    Objective:  System    Physical Exam    General     ROS    Assessment/Plan:    PROGRESS  REPORT    Progress reporting period is from 10/23/2018 to 12/7/2018.       SUBJECTIVE  Subjective changes noted by patient:  Giana was able to return fully to soccer and approximately 1 month ago was playing in a game and was hit on the outside of her foot (same mechanism as previous injury) and had onset of knee pain again. She notes that the pain is nearly gone except at end range flexion. She returns today to ensure that there is no injury to her meniscus and discuss her return to soccer again. She has minimal functional impairments. She reports high anxiety regarding returning to soccer.       Current pain level is 0/10  .     Previous pain level was 4/10   Changes in function:  Yes (See Goal flowsheet attached for changes in current functional level)  Adverse reaction to treatment or activity: None    OBJECTIVE  Changes noted in objective findings:  Posterior pain at end range flexion with over pressure. Full extension without pain.   No pain with resisted HS testing or gastroc testing.   No effusion. TTP over medial joint line   Negative meniscal testing (Apley's or thesaly's).         ASSESSMENT/PLAN  Updated problem list and treatment plan: Diagnosis 1:  Knee pain  Pain -  home program  Decreased joint mobility - manual therapy, therapeutic exercise and home program  Decreased function - therapeutic activities and home program  STG/LTGs have been met or progress has been made towards goals:  Yes (See Goal flow sheet completed today.)  Assessment of Progress: The patient's condition is improving.  Self Management Plans:  Patient has been instructed in a home treatment program.  Patient  has been instructed in self management of symptoms.  I have re-evaluated this patient and find that the nature, scope, duration and intensity of the therapy is appropriate for the medical condition of the  patient.  Yany continues to require the following intervention to meet STG and LTG's:  PT    Recommendations:  This patient would benefit from continued therapy.     Frequency:  1 X a month, twice daily  Duration:  for 1 months        Please refer to the daily flowsheet for treatment today, total treatment time and time spent performing 1:1 timed codes.

## 2019-02-15 ENCOUNTER — HEALTH MAINTENANCE LETTER (OUTPATIENT)
Age: 29
End: 2019-02-15

## 2019-02-26 ENCOUNTER — OFFICE VISIT (OUTPATIENT)
Dept: ORTHOPEDICS | Facility: CLINIC | Age: 29
End: 2019-02-26
Payer: COMMERCIAL

## 2019-02-26 VITALS
DIASTOLIC BLOOD PRESSURE: 68 MMHG | WEIGHT: 146.8 LBS | HEART RATE: 55 BPM | HEIGHT: 67 IN | SYSTOLIC BLOOD PRESSURE: 128 MMHG | BODY MASS INDEX: 23.04 KG/M2

## 2019-02-26 DIAGNOSIS — M25.561 CHRONIC PAIN OF RIGHT KNEE: Primary | ICD-10-CM

## 2019-02-26 DIAGNOSIS — G89.29 CHRONIC PAIN OF RIGHT KNEE: Primary | ICD-10-CM

## 2019-02-26 RX ORDER — OMEGA-3 FATTY ACIDS/FISH OIL 300-1000MG
200 CAPSULE ORAL
COMMUNITY
End: 2020-02-04

## 2019-02-26 ASSESSMENT — MIFFLIN-ST. JEOR: SCORE: 1438.51

## 2019-02-26 NOTE — LETTER
"2019       RE: Yany Tolliver  [address] [place] [address]     Dear Colleague,    Thank you for referring your patient, Yany Tolliver, to the [facility] SPORTS AND ORTHOPAEDIC WALK IN CLINIC at [facility]. Please see a copy of my visit note below.    [facility]  Orthopedics  Rachid Alvarado, DO  2019     Name: Yany Tolliver  MRN: [id number]  Age: 28 year old  : 1990  Referring provider: Referred Self     Chief Complaint: Consult (Right knee pain )     Date of Injury: Approximately 2 weeks ago    History of Present Illness:   Yany Tolliver is a 28 year old female , with a past medical history of right knee pain, who presents today for evaluation of right knee pain that was aggravated approximately two weeks ago while playing soccer. She notes that she felt a pop when she planted her right foot which rotated, after which she felt immediate excruciating pain at the time. Today, she endorses minimal pain in the medial and posterior aspect of her right knee. Pain is alleviated with rest and physical therapy. Pain is exacerbated when bending her knees and playing soccer. She denies any locking or catching. She does note that her knee gave away before physical therapy.     Review of Systems:   A 10-point review of systems was obtained and is negative except for as noted in the HPI.     Medications:   The patient denies any significant past medical history.    Allergies:  The patient reports no known allergies.    Past Medical History:  The patient denies any significant past medical history.    Past Surgical History:  The patient does not have any pertinent past surgical history.     Social History:  She denies tobacco use.     Family History:  No past pertinent family history.    Physical Examination:  Blood pressure 128/68, pulse 55, height 1.702 m (5' 7\"), weight 66.6 kg (146 lb 12.8 oz).   General  - normal " appearance, in no obvious distress  CV  - normal popliteal pulse  Pulm  - normal respiratory pattern, non-labored  Musculoskeletal - right knee  - stance: normal gait without limp, normal single leg squat, no obvious leg length discrepancy  - inspection: no significant swelling or effusion, normal bone and joint alignment, no obvious deformity  - palpation: no joint line tenderness, patella and patellar tendon non-tender  - ROM: 140 degrees flexion, -5 degrees extension, not painful, normal actively and passively compared to contralateral  - strength: 5/5 in flexion, 5/5 in extension  - special tests:  (-) Lachman  (-) anterior drawer  (-) posterior drawer  (-) Angel  (-) Thessaly  (-) varus at 0 and 30 degrees flexion  (-) valgus at 0 and 30 degrees flexion  (-) Shyam s compression test  (-) patellar apprehension  Neuro  - no sensory or motor deficit, grossly normal coordination, normal muscle tone  Skin  - no ecchymosis, erythema, warmth, or induration, no obvious rash  Psych  - interactive, appropriate, normal mood and affect    Assessment:   28 year old female with a past medical history of partial ACL tear and chronic right knee pain intermittently over last six months, presenting today with an episode of acute right knee pain while playing soccer two weeks ago. Clinical examination was rather unremarkable but given her history of acute pain with pivoting type activities like soccer, it is suggestive of possible intermittently symptomatic meniscal tear.  Given her previous partial ACL tear, knee instability remains on the differential diagnosis.  Given her lack of improvement with conservative treatments including PT over the last 6 months, an MRI was warranted.     Plan:   -Ordered MRI for further evaluation of right knee.   -Continue bracing, activity modification, analgesics, ice as needed  -Follow-up with me after the MRI to discuss results.     I, Rachid Alvarado DO, have reviewed the above note and agree  with the scribe's notation as written.    Scribe Disclosure:   I, Shantanu Levyam, am serving as a scribe to document services personally performed by Rachid Alvarado DO at this visit, based upon the provider's statements to me. All documentation has been reviewed by the aforementioned provider prior to being entered into the official medical record.            SPORTS & ORTHOPEDIC WALK-IN VISIT 2/26/2019    Primary Care Physician: No PCP     The patient reports that she keeps re-aggravating her right knee. She was better after a course of PT last Fall. She reports a recent flare up 2 weeks ago while playing Soccer. She felt a pop when she plated her right foot and she rotated. She reports that pain is medial and posterior    Reason for visit:     What part of your body is injured / painful?  right knee    What caused the injury /pain? Recreational/competitive sports injury - Soccer    How long ago did your injury occur or pain begin? several weeks ago    What are your most bothersome symptoms? Pain and Swelling    How would you characterize your symptom?  Excruciating initially, fine now    What makes your symptoms better? Other: PT prior, rest    What makes your symptoms worse? Bending and playing Soccer    Have you been previously seen for this problem? Yes, Dr. Castano     Medical History:    Any recent changes to your medical history? No    Any new medication prescribed since last visit? No    Have you had surgery on this body part before? No    Social History:    Occupation: Retail Operation Coordinator at [employer]    Handedness: Right    Exercise: Soccer, running    Review of Systems:    Do you have fever, chills, weight loss? No    Do you have any vision problems? No    Do you have any chest pain or edema? No    Do you have any shortness of breath or wheezing?  No    Do you have stomach problems? No    Do you have any numbness or focal weakness? No    Do you have diabetes? No    Do you have problems with  bleeding or clotting? No    Do you have an rashes or other skin lesions? No         Again, thank you for allowing me to participate in the care of your patient.      Sincerely,    Rachid Alvarado, DO

## 2019-02-26 NOTE — PROGRESS NOTES
"[facility]  Orthopedics  Rachid Alvarado, DO  2019     Name: Yany Tolliver  MRN: [id number]  Age: 28 year old  : 1990  Referring provider: Referred Self     Chief Complaint: Consult (Right knee pain )     Date of Injury: Approximately 2 weeks ago    History of Present Illness:   Yany Tolliver is a 28 year old female , with a past medical history of right knee pain, who presents today for evaluation of right knee pain that was aggravated approximately two weeks ago while playing soccer. She notes that she felt a pop when she planted her right foot which rotated, after which she felt immediate excruciating pain at the time. Today, she endorses minimal pain in the medial and posterior aspect of her right knee. Pain is alleviated with rest and physical therapy. Pain is exacerbated when bending her knees and playing soccer. She denies any locking or catching. She does note that her knee gave away before physical therapy.     Review of Systems:   A 10-point review of systems was obtained and is negative except for as noted in the HPI.     Medications:   The patient denies any significant past medical history.    Allergies:  The patient reports no known allergies.    Past Medical History:  The patient denies any significant past medical history.    Past Surgical History:  The patient does not have any pertinent past surgical history.     Social History:  She denies tobacco use.     Family History:  No past pertinent family history.    Physical Examination:  Blood pressure 128/68, pulse 55, height 1.702 m (5' 7\"), weight 66.6 kg (146 lb 12.8 oz).   General  - normal appearance, in no obvious distress  CV  - normal popliteal pulse  Pulm  - normal respiratory pattern, non-labored  Musculoskeletal - right knee  - stance: normal gait without limp, normal single leg squat, no obvious leg length discrepancy  - inspection: no significant swelling or effusion, normal bone and joint " alignment, no obvious deformity  - palpation: no joint line tenderness, patella and patellar tendon non-tender  - ROM: 140 degrees flexion, -5 degrees extension, not painful, normal actively and passively compared to contralateral  - strength: 5/5 in flexion, 5/5 in extension  - special tests:  (-) Lachman  (-) anterior drawer  (-) posterior drawer  (-) Angel  (-) Thessaly  (-) varus at 0 and 30 degrees flexion  (-) valgus at 0 and 30 degrees flexion  (-) Shyam s compression test  (-) patellar apprehension  Neuro  - no sensory or motor deficit, grossly normal coordination, normal muscle tone  Skin  - no ecchymosis, erythema, warmth, or induration, no obvious rash  Psych  - interactive, appropriate, normal mood and affect    Assessment:   28 year old female with a past medical history of partial ACL tear and chronic right knee pain intermittently over last six months, presenting today with an episode of acute right knee pain while playing soccer two weeks ago. Clinical examination was rather unremarkable but given her history of acute pain with pivoting type activities like soccer, it is suggestive of possible intermittently symptomatic meniscal tear.  Given her previous partial ACL tear, knee instability remains on the differential diagnosis.  Given her lack of improvement with conservative treatments including PT over the last 6 months, an MRI was warranted.     Plan:   -Ordered MRI for further evaluation of right knee.   -Continue bracing, activity modification, analgesics, ice as needed  -Follow-up with me after the MRI to discuss results.     Rachid YANG DO, have reviewed the above note and agree with the scribe's notation as written.    Scribe Disclosure:   Shantanu YANG, am serving as a scribe to document services personally performed by Rachid Alvarado DO at this visit, based upon the provider's statements to me. All documentation has been reviewed by the aforementioned provider prior to being  entered into the official medical record.

## 2019-02-26 NOTE — LETTER
Date:February 28, 2019      Patient was self referred, no letter generated. Do not send.        [facility] Health Information

## 2019-02-26 NOTE — PROGRESS NOTES
SPORTS & ORTHOPEDIC WALK-IN VISIT 2/26/2019    Primary Care Physician: No PCP     The patient reports that she keeps re-aggravating her right knee. She was better after a course of PT last Fall. She reports a recent flare up 2 weeks ago while playing Soccer. She felt a pop when she plated her right foot and she rotated. She reports that pain is medial and posterior    Reason for visit:     What part of your body is injured / painful?  right knee    What caused the injury /pain? Recreational/competitive sports injury - Soccer    How long ago did your injury occur or pain begin? several weeks ago    What are your most bothersome symptoms? Pain and Swelling    How would you characterize your symptom?  Excruciating initially, fine now    What makes your symptoms better? Other: PT prior, rest    What makes your symptoms worse? Bending and playing Soccer    Have you been previously seen for this problem? Yes, Dr. Castano     Medical History:    Any recent changes to your medical history? No    Any new medication prescribed since last visit? No    Have you had surgery on this body part before? 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 No    Do you have any vision problems? No    Do you have any chest pain or edema? No    Do you have any shortness of breath or wheezing?  No    Do you have stomach problems? No    Do you have any numbness or focal weakness? No    Do you have diabetes? No    Do you have problems with bleeding or clotting? No    Do you have an rashes or other skin lesions? No

## 2019-02-27 ENCOUNTER — TELEPHONE (OUTPATIENT)
Dept: ORTHOPEDICS | Facility: CLINIC | Age: 29
End: 2019-02-27

## 2019-02-27 NOTE — TELEPHONE ENCOUNTER
[facility] Call Center    Phone Message    May a detailed message be left on voicemail: no    Reason for Call: Order(s): Other:   Reason for requested: MRI  RT Knee without contrast   Date needed: ASAP Please fax to [phone]  Provider name: Dr. Alvarado       Action Taken: Message routed to:  [facility] Ortho

## 2019-02-27 NOTE — PROGRESS NOTES
"CHIEF COMPLAINT:  Consult (Right knee pain )       HISTORY OF PRESENT ILLNESS  Ms. Tolliver is a pleasant 28 year old year old female who presents to clinic today with right knee pain.  Yany explains that ***    Onset: {dsonset:145032}  Location: {LOCATION:431810}  Quality:  {PAIN ASSESSMENT:824690}  Duration: {ds duration:145003}  Severity: {BlankBase Single Select.:462733::\"1\",\"2\",\"3\",\"4\",\"5\",\"6\",\"7\",\"8\",\"9\",\"10\"}/10 at worst  Timing:{TIMINGsn:407490}  Modifying factors:  resting and non-use makes it better, movement and use makes it worse  Associated signs & symptoms: {ASSOCIATED SX:014788}  Previous similar pain: {Yes / No:789459}  Treatments to date:***    Additional history: as documented    MEDICAL HISTORY  Patient Active Problem List   Diagnosis     Right knee pain       Current Outpatient Medications   Medication Sig Dispense Refill     ibuprofen (ADVIL) 200 MG capsule Take 200 mg by mouth         No Known Allergies    No family history on file.    Additional medical/Social/Surgical histories reviewed in [facility] and updated as appropriate.     REVIEW OF SYSTEMS (2/27/2019)  CONSTITUTIONAL: Denies fever and weight loss  EYES: Denies acute vision changes  ENT: Denies hearing changes or difficulty swallowing  CARDIAC: Denies chest pain or edema  RESPIRATORY: Denies dyspnea, cough or wheeze  GASTROINTESTINAL: Denies abdominal pain, nausea, vomiting  MUSCULOSKELETAL: See HPI  SKIN: Denies any recent rash or lesion  NEUROLOGICAL: Denies numbness or focal weakness  PSYCHIATRIC: No history of psychiatric symptoms or problems***  ENDOCRINE: Diagnosis of diabetes:*** No results found for: A1C  HEMATOLOGY: Denies episodes of easy bleeding      PHYSICAL EXAM  /68 (BP Location: Right arm, Patient Position: Sitting, Cuff Size: Adult Regular)   Pulse 55   Ht 1.702 m (5' 7\")   Wt 66.6 kg (146 lb 12.8 oz)   BMI 22.99 kg/m          IMAGING : {ds laterality:422383}. Final results and radiologist's interpretation, " available in the [facility] health record. Images were reviewed with the patient/family members in the office today. My personal interpretation of the performed imaging is ***     ASSESSMENT & PLAN  Ms. Tolliver is a 28 year old year old female who presents to clinic today with***.    Diagnosis: ***    - ***  -Follow up ***    It was a pleasure seeing Yany today.    Rachid Alvarado DO, [facility]  Primary Care Sports Medicine

## 2019-02-28 ENCOUNTER — TELEPHONE (OUTPATIENT)
Dept: ORTHOPEDICS | Facility: CLINIC | Age: 29
End: 2019-02-28

## 2019-02-28 NOTE — TELEPHONE ENCOUNTER
[facility] Call Center    Phone Message    May a detailed message be left on voicemail: yes    Reason for Call: Other: [facility] requesting orders for Right Knee MRI without contrast be faxed to [phone] asap.     Action Taken: Message routed to:  [facility] walk in ortho

## 2019-03-01 NOTE — TELEPHONE ENCOUNTER
[facility] Call Center    Phone Message    May a detailed message be left on voicemail: yes    Reason for Call: Other: Pt was calling to see if Dr. Alvarado had followed up with the previous request from [facility]. Pt has a scheduled appt with [facility] tomorrow, 3/1/2019, at 12pm and needs the new fax order to be sent over. Please follow up with Pt asap.      Action Taken: Message routed to:  [facility] Walk in Clinic

## 2019-03-22 ENCOUNTER — THERAPY VISIT (OUTPATIENT)
Dept: PHYSICAL THERAPY | Facility: CLINIC | Age: 29
End: 2019-03-22
Payer: COMMERCIAL

## 2019-03-22 DIAGNOSIS — G89.29 CHRONIC PAIN OF RIGHT KNEE: Primary | ICD-10-CM

## 2019-03-22 DIAGNOSIS — M25.561 CHRONIC PAIN OF RIGHT KNEE: Primary | ICD-10-CM

## 2019-03-22 PROCEDURE — 97530 THERAPEUTIC ACTIVITIES: CPT | Mod: GP | Performed by: PHYSICAL THERAPIST

## 2019-03-22 PROCEDURE — 97161 PT EVAL LOW COMPLEX 20 MIN: CPT | Mod: GP | Performed by: PHYSICAL THERAPIST

## 2019-03-22 PROCEDURE — 97110 THERAPEUTIC EXERCISES: CPT | Mod: GP | Performed by: PHYSICAL THERAPIST

## 2019-03-22 NOTE — PROGRESS NOTES
PHYSICAL THERAPIST IMPRESSION: Giana presents to physical therapy with recurrent right knee injuries secondary to soccer, planting/twisting injuries. Her most recent MRI is 1 year ago and she is not interested in getting a new one at this time. She does not have an effusion or a lack of knee motion which makes a meniscal injury or ACL injury less likely. The one avenue we have not explored with Giana is the patellafemoral compartment as a pain generator. Her pain could be consistent with PF pain. She has deficits in regards to her movement patterns that could be contributing. A trial of PF taping was performed today and she is to assess her response and report back.        PT MODIFIABLE FACTORS PRESENT NOT PRESENT   Proximal LE/Trunk mm weakness +    Quadriceps muscle dysfunction/weakness +    Poor postural stability +    Poor dynamic movement patterns +    Restricted ankle DF  +   Previous PF PT Interventions +      PATIENT BONY ALIGNMENT SUSPICIOUS FOR: (to be determined by MD and imaging studies):    Limb version (?)      Physical Therapy Initial Evaluation: Subjective History     Injury/Condition Details:  Presenting Complaint Right knee pain   Onset Timing/Date 2/22/2018   Mechanism During a soccer game, fell down. Couldn't walk on it, had to be carried off the field. It was a planted and twisting mechanism. Had a little swelling < 24 hours.   Fully functional with the exception of soccer.   Started a couple of work-out videos (HIIT, abs). Went for the first run yesterday without issues.   The right leg is her kicking leg.      Symptom Behavior Details    Primary Symptoms Sporadic symptoms; Activity/position dependent, pain (Location: Medial sided knee pain, Quality: Aching/Throbbing), stiffness, weakness   Worst Pain 3-4/10 (with attempts at playing soccer)   Symptom Provocators Kicking with a bent knee (rather than locked out)  End range knee flexion   Best Pain 0/10    Symptom Relievers Most of the time    Time of day dependent? No   Recent symptom change? symptoms improving     Prior Testing/Intervention for current condition:  Prior Tests MRI (February 2018)   Prior Treatment PT  and Brace (hinged sleeve)     Lifestyle & General Medical History: see previous evaluations    Lower Extremity Physical Therapy Examination    Dynamic Movement Screen:  2 leg stance: Equal weight bearing, suspicious for squinting patellae  2 leg squat:Normal    1 leg stance:   Right: excessive lateral trunk lean over stance limb  Left: normal    1 leg squat:   Right: proprioceptive challenge and excessive anterior knee excursion  Left: proprioceptive challenge and excessive anterior knee excursion    Gait: Suspicious for internal rotation of the limbs    Patellofemoral Joint Examination:  Test Right Left   Patellar Orientation:   90 deg flexion Mild lateral tilt and Mild lateral translation Mild lateral tilt and Mild lateral translation   OKC Patellar Tracking Normal Increased lateralization into TKE   Patellar Orientation:  0 deg flexion Mild lateral tilt, Mild lateral translation and patella artem visually observed Mild lateral tilt, Mild lateral translation and patella artem visually observed   Quadrant Mobility (Med:Lat) 2:3  2:3   Effusion 0 0   Quad Activation Depressed intensity Normal     Knee Joint AROM: significant hyperextension, ROM is symmetrical    Negative ligamentous screen.   Negative meniscal testing    Palpation:   NOT Tender to palpation at the following structures: medial joint line and lateral joint line    Hip Joint PROM Screen   Right Left Difference   Flex WNL deg WNL deg 0 deg   ER 45 deg 50 deg 5 deg   IR 30 deg 35 deg 5 deg     Lower Extremity Muscle Strength (x/5)   Right Left   Hip ER Pain/5 4+/5   Hip IR 5-/5 5-/5   Hip ABD 4+/5 4+/5   Hip ADD NT/5 NT/5   Hip Ext 4+/5 4+/5   Knee Flex 5/5 5/5     Lower Extremity Flexibility Screen:   Right Left   Hamstring + +   CARROLL + -   - = normal  + = mild tightness  ++ =  moderate tightness  +++ = significant tightness        Assessment/Plan:  Patient is a 28 year old female with right side knee complaints.    Patient has the following significant findings with corresponding treatment plan.                Diagnosis 1:  Right knee pain  Pain -  hot/cold therapy, manual therapy, STS, splint/taping/bracing/orthotics, self management and education  Decreased ROM/flexibility - manual therapy, therapeutic exercise and home program  Decreased strength - therapeutic exercise, therapeutic activities and home program  Impaired balance - neuro re-education, therapeutic activities and home program  Decreased proprioception - neuro re-education, therapeutic activities and home program  Decreased function - therapeutic activities and home program    Therapy Evaluation Codes:   1) History comprised of:   Personal factors that impact the plan of care:      Overall behavior pattern.    Comorbidity factors that impact the plan of care are:      None.     Medications impacting care: None.  2) Examination of Body Systems comprised of:   Body structures and functions that impact the plan of care:      Knee.   Activity limitations that impact the plan of care are:      Sports.  3) Clinical presentation characteristics are:   Stable/Uncomplicated.  4) Decision-Making    Low complexity using standardized patient assessment instrument and/or measureable assessment of functional outcome.  Cumulative Therapy Evaluation is: Low complexity.    Previous and current functional limitations:  (See Goal Flow Sheet for this information)    Short term and Long term goals: (See Goal Flow Sheet for this information)     Communication ability:  Patient appears to be able to clearly communicate and understand verbal and written communication and follow directions correctly.  Treatment Explanation - The following has been discussed with the patient:   RX ordered/plan of care  Anticipated outcomes  Possible risks and side  effects  This patient would benefit from PT intervention to resume normal activities.   Rehab potential is good.    Frequency:  1 X week, once daily  Duration:  for 6 weeks  Discharge Plan:  Achieve all LTG.  Independent in home treatment program.  Reach maximal therapeutic benefit.    Please refer to the daily flowsheet for treatment today, total treatment time and time spent performing 1:1 timed codes.

## 2019-03-29 ENCOUNTER — THERAPY VISIT (OUTPATIENT)
Dept: PHYSICAL THERAPY | Facility: CLINIC | Age: 29
End: 2019-03-29
Payer: COMMERCIAL

## 2019-03-29 DIAGNOSIS — G89.29 CHRONIC PAIN OF RIGHT KNEE: ICD-10-CM

## 2019-03-29 DIAGNOSIS — M25.561 CHRONIC PAIN OF RIGHT KNEE: ICD-10-CM

## 2019-03-29 PROCEDURE — 97110 THERAPEUTIC EXERCISES: CPT | Mod: GP | Performed by: PHYSICAL THERAPIST

## 2019-03-29 PROCEDURE — 97530 THERAPEUTIC ACTIVITIES: CPT | Mod: GP | Performed by: PHYSICAL THERAPIST

## 2019-05-30 PROBLEM — M25.561 RIGHT KNEE PAIN: Status: RESOLVED | Noted: 2018-07-11 | Resolved: 2019-05-30

## 2019-08-13 ENCOUNTER — OFFICE VISIT (OUTPATIENT)
Dept: ORTHOPEDICS | Facility: CLINIC | Age: 29
End: 2019-08-13
Payer: COMMERCIAL

## 2019-08-13 DIAGNOSIS — G89.29 CHRONIC PAIN OF RIGHT KNEE: Primary | ICD-10-CM

## 2019-08-13 DIAGNOSIS — M25.561 CHRONIC PAIN OF RIGHT KNEE: Primary | ICD-10-CM

## 2019-08-13 NOTE — PROGRESS NOTES
SPORTS & ORTHOPEDIC WALK-IN FOLLOW-UP VISIT 8/13/2019    Interval History:     Follow up reason: right knee pain- reinjured    Date of injury: 8/8/19    Date last seen: 2/26/19    Following Therapeutic Plan: Yes     Pain: NA    Function: NA    Interval History: Playing soccer and took a shot and body angle was off and all weight was forced on the lateral side of right knee. Did not hear a pop. Swelled up, and swelling as gone down.      Medical History:    Any recent changes to your medical history? No    Any new medication prescribed since last visit? No    Review of Systems:    Do you have fever, chills, weight loss? No    Do you have any vision problems? No    Do you have any chest pain or edema? No    Do you have any shortness of breath or wheezing?  No    Do you have stomach problems? No    Do you have any numbness or focal weakness? No    Do you have diabetes? No    Do you have problems with bleeding or clotting? No    Do you have an rashes or other skin lesions? No

## 2019-08-13 NOTE — PROGRESS NOTES
"[facility]  Orthopedics  Rachid Alvarado DO  2019     Name: Yany Tolliver  MRN: [id number]  Age: 28 year old  : 1990  Referring provider: Referred Self     Chief Complaint: No chief complaint on file.   ***    Date of Injury: ***    History of Present Illness:   Yany Tolliver is a 28 year old, {RIGHT/LEFT/AMBI:[id number]::\"right handed\"} female who presents today for evaluation of ***.    Review of Systems:   A 10-point review of systems was obtained and is negative except for as noted in the HPI.     Medications:     Current Outpatient Medications:      ibuprofen (ADVIL) 200 MG capsule, Take 200 mg by mouth, Disp: , Rfl:     Allergies:  Patient has no known allergies.     Past Medical History:  No past medical history on file.    Past Surgical History:  No past surgical history on file.     Social History:      Family History:  No family history on file.    Physical Examination:  There were no vitals taken for this visit.  ***    Imaging:   Radiographs of the *** - *** views (2019)  ***    I have independently reviewed the above imaging studies; the results were discussed with the patient.     Assessment:   28 year old, {RIGHT/LEFT/AMBI:[id number]::\"right handed\"} female with ***    Plan:   ***      Scribe Disclosure:  I, Castro Serrato, am serving as a scribe to document services personally performed by Rachid Alvarado DO at this visit, based upon the provider's statements to me. All documentation has been reviewed by the aforementioned provider prior to being entered into the official medical record.     "

## 2019-08-13 NOTE — LETTER
Date:August 16, 2019      Patient was self referred, no letter generated. Do not send.        [facility] Health Information

## 2019-08-13 NOTE — PROGRESS NOTES
[facility]  Orthopedics  Rachid Alvarado,   2019     Name: Yany Tolliver  MRN: [id number]  Age: 28 year old  : 1990  Referring provider: Referred Self     Chief Complaint: Right knee pain follow-up    Date of Injury: 19    History of Present Illness:   Yany Tolliver is a 28 year old, female with a past medical history of partial ACL tear who presents today for follow-up regarding her chronic knee pain. I last evaluated the patient on 19, at which time I was suspicious of a meniscal tear. Today, the patient reports that she was paying soccer and took a shot and her body angle was off and all her weight was forced on the lateral side of her right knee. She did not hear a pop, but she had swelling afterwards. Since then, the swelling has subsided.    Review of Systems:   A 10-point review of systems was obtained and is negative except for as noted in the HPI.     Physical Examination:  General  - normal appearance, in no obvious distress  CV  - normal popliteal pulse  Pulm  - normal respiratory pattern, non-labored  Musculoskeletal - right knee  - stance: normal gait without limp  - inspection: no swelling or effusion, normal bone and joint alignment  - palpation: no joint line tenderness, patella and patellar tendon non-tender  - ROM: 140 degrees flexion, -5 degrees extension, not painful, normal actively and passively compared to contralateral  - strength: 5/5 in flexion, 5/5 in extension  - special tests:  (-) Lachman  (-) anterior drawer  (-) posterior drawer  (+) Angel, pain in the lateral knee   (-) varus at 0 and 30 degrees flexion  (-) valgus at 0 and 30 degrees flexion  (-) Shyam s compression test  (-) patellar apprehension  Neuro  - no sensory or motor deficit, grossly normal coordination, normal muscle tone  Skin  - no ecchymosis, erythema, warmth, or induration, no obvious rash  Psych  - interactive, appropriate, normal mood and affect    Assessment:   28 year old,  female with acute on chronic right knee pain which has been persistent despite interventions including bracing, PT, and rest. At this time, an MRI is warranted to further elucidate possible meniscal injury vs other internal derangement, and possibly chondromalacia.    Plan:   - Obtain a right knee MRI. Follow-up with me after to discuss the results  - Ice, analgesics OTC  - No activity restrictions but she should perform activities within her limitations    It was a pleasure seeing Yany today.    Rachid Alvarado DO, [facility]  Primary Care Sports Medicine    I, Rachid Alvarado DO, have reviewed the above note and agree with the scribe's notation as written.    Scribe Disclosure:  I, Castro Serrato, am serving as a scribe to document services personally performed by Rachid Alvarado DO at this visit, based upon the provider's statements to me. All documentation has been reviewed by the aforementioned provider prior to being entered into the official medical record.

## 2019-08-13 NOTE — LETTER
2019       RE: Yany Tolliver  [address] [place] [address]     Dear Colleague,    Thank you for referring your patient, Yany Tolliver, to the [facility] SPORTS AND ORTHOPAEDIC WALK IN CLINIC at [facility]. Please see a copy of my visit note below.          SPORTS & ORTHOPEDIC WALK-IN FOLLOW-UP VISIT 2019    Interval History:     Follow up reason: right knee pain- reinjured    Date of injury: 19    Date last seen: 19    Following Therapeutic Plan: Yes     Pain: NA    Function: NA    Interval History: Playing soccer and took a shot and body angle was off and all weight was forced on the lateral side of right knee. Did not hear a pop. Swelled up, and swelling as gone down.      Medical History:    Any recent changes to your medical history? No    Any new medication prescribed since last visit? No    Review of Systems:    Do you have fever, chills, weight loss? No    Do you have any vision problems? No    Do you have any chest pain or edema? No    Do you have any shortness of breath or wheezing?  No    Do you have stomach problems? No    Do you have any numbness or focal weakness? No    Do you have diabetes? No    Do you have problems with bleeding or clotting? No    Do you have an rashes or other skin lesions? No             [facility]  Orthopedics  Rachid Alvarado,   2019     Name: Yany Tolliver  MRN: [id number]  Age: 28 year old  : 1990  Referring provider: Referred Self     Chief Complaint: Right knee pain follow-up    Date of Injury: 19    History of Present Illness:   Yany Tolliver is a 28 year old, female with a past medical history of partial ACL tear who presents today for follow-up regarding her chronic knee pain. I last evaluated the patient on 19, at which time I was suspicious of a meniscal tear. Today, the patient reports that she was paying soccer and took a shot and her body angle  was off and all her weight was forced on the lateral side of her right knee. She did not hear a pop, but she had swelling afterwards. Since then, the swelling has subsided.    Review of Systems:   A 10-point review of systems was obtained and is negative except for as noted in the HPI.     Physical Examination:  General  - normal appearance, in no obvious distress  CV  - normal popliteal pulse  Pulm  - normal respiratory pattern, non-labored  Musculoskeletal - right knee  - stance: normal gait without limp  - inspection: no swelling or effusion, normal bone and joint alignment  - palpation: no joint line tenderness, patella and patellar tendon non-tender  - ROM: 140 degrees flexion, -5 degrees extension, not painful, normal actively and passively compared to contralateral  - strength: 5/5 in flexion, 5/5 in extension  - special tests:  (-) Lachman  (-) anterior drawer  (-) posterior drawer  (+) Angel, pain in the lateral knee   (-) varus at 0 and 30 degrees flexion  (-) valgus at 0 and 30 degrees flexion  (-) Shyam s compression test  (-) patellar apprehension  Neuro  - no sensory or motor deficit, grossly normal coordination, normal muscle tone  Skin  - no ecchymosis, erythema, warmth, or induration, no obvious rash  Psych  - interactive, appropriate, normal mood and affect    Assessment:   28 year old, female with acute on chronic right knee pain which has been persistent despite interventions including bracing, PT, and rest. At this time, an MRI is warranted to further elucidate possible meniscal injury vs other internal derangement, and possibly chondromalacia.    Plan:   - Obtain a right knee MRI. Follow-up with me after to discuss the results  - Ice, analgesics OTC  - No activity restrictions but she should perform activities within her limitations    It was a pleasure seeing Yany today.    Rachid Alvarado DO, [facility]  Primary Care Sports Medicine    I, Rachid Alvarado DO, have reviewed the above note and agree  with the scribe's notation as written.    Scribe Disclosure:  I, Castro Serrato, am serving as a scribe to document services personally performed by Rachid Alvarado DO at this visit, based upon the provider's statements to me. All documentation has been reviewed by the aforementioned provider prior to being entered into the official medical record.          Again, thank you for allowing me to participate in the care of your patient.      Sincerely,    Rachid Alvarado DO

## 2019-08-13 NOTE — PROGRESS NOTES
"[facility]  Orthopedics  Rachid Flores DO  2019     Name: Yany Tolilver  MRN: [id number]  Age: 28 year old  : 1990  Referring provider: Referred Self     Chief Complaint:     Date of Injury: ***    History of Present Illness:   Yany Tolliver is a 28 year old, {RIGHT/LEFT/AMBI:[id number]::\"right handed\"} female who presents today for follow-up regarding ***.     Review of Systems:   A 10-point review of systems was obtained and is negative except for as noted in the HPI.     Physical Examination:  There were no vitals taken for this visit.  ***    Imaging:  Radiographs of the *** - *** views (2019)  ***    I have independently reviewed the above imaging studies; the results were discussed with the patient.     Assessment:   28 year old, {RIGHT/LEFT/AMBI:[id number]::\"right handed\"} female with ***    Plan:   ***    Scribe Disclosure:  I, Castro Serrato, am serving as a scribe to document services personally performed by Rachid Flores DO at this visit, based upon the provider's statements to me. All documentation has been reviewed by the aforementioned provider prior to being entered into the official medical record.    "

## 2019-11-05 ENCOUNTER — OFFICE VISIT (OUTPATIENT)
Dept: ORTHOPEDICS | Facility: CLINIC | Age: 29
End: 2019-11-05
Payer: COMMERCIAL

## 2019-11-05 VITALS — HEIGHT: 67 IN | BODY MASS INDEX: 22.91 KG/M2 | WEIGHT: 146 LBS

## 2019-11-05 DIAGNOSIS — M25.561 ACUTE PAIN OF RIGHT KNEE: Primary | ICD-10-CM

## 2019-11-05 ASSESSMENT — MIFFLIN-ST. JEOR: SCORE: 1429.88

## 2019-11-05 NOTE — PROGRESS NOTES
SPORTS & ORTHOPEDIC WALK-IN FOLLOW-UP VISIT 11/5/2019    Interval History:     Follow up reason: Right knee injury    Date of injury: 8/8/19    Date last seen: 8/13/19    Following Therapeutic Plan: Yes     Pain: Worsening    Function: Worsening    Interval History:  She re injured knee on Thursday during a soccer game. Since the last time she was seen she had no issues or pain and was able to play soccer normally. She reports that she felt a pop when she re-injured. Pain is located on medial side and posterolateral aspect of knee. She is interested in an MRI.     Medical History:    Any recent changes to your medical history? No    Any new medication prescribed since last visit? No    Review of Systems:    Do you have fever, chills, weight loss? No    Do you have any vision problems? No    Do you have any chest pain or edema? No    Do you have any shortness of breath or wheezing?  No    Do you have stomach problems? No    Do you have any numbness or focal weakness? No    Do you have diabetes? No    Do you have problems with bleeding or clotting? No    Do you have an rashes or other skin lesions? No

## 2019-11-05 NOTE — LETTER
"2019       RE: Yany Tolliver  [address] [place] [address]     Dear Colleague,    Thank you for referring your patient, Yany Tolliver, to the [facility] SPORTS AND ORTHOPAEDIC WALK IN CLINIC at [facility]. Please see a copy of my visit note below.    [facility]  Orthopedics  Rachid Alvarado, DO  2019     Name: Yany Tolliver  MRN: [id number]  Age: 29 year old  : 1990  Referring provider: Referred Self     Chief Complaint: Pain of the Right Knee    Date of Injury: 19, re-injury 10/31/19    History of Present Illness:   Yany Tolliver is a 29 year old female with a history of partial right ACL tear who presents today for follow-up regarding right knee pain.  The patient was last seen on 19 after a new injury to her right knee while playing soccer.  MRI was recommended to evaluate for possible meniscal injury versus other internal derangement.    She did not complete the MRI as she was feeling better and she had returned to playing soccer without difficulty.  However she re-injured her knee 5 days playing soccer.  She put her right leg out to steady herself, causing her knee to deviate laterally.  She has pain along the inside of her knee and slight pain behind her knee.  She is wearing a brace for comfort and feels okay weightbearing when just standing.  She is nervous about pivoting with her knee.  Her knee is not catching, locking, or giving way.    Review of Systems:   A 10-point review of systems was obtained and is negative except for as noted in the HPI.     Physical Examination:  Height 1.702 m (5' 7\"), weight 66.2 kg (146 lb).     General  - normal appearance, in no obvious distress  Musculoskeletal - right knee  - stance: normal gait without limp, no obvious leg length discrepancy  - inspection: no swelling or effusion, normal bone and joint alignment, no obvious deformity  - palpation: Tender over " superior aspect of patellar tendon and medial joint line, lateral joint line nontender, patella tendon non-tender, tenderness to palpation at lateral popliteal fossa  - ROM: 0 to 90 degrees  - strength: 5/5 in flexion, 5/5 in extension  - special tests:  (-) Lachman  (-) anterior drawer  (-) posterior drawer  (+) Angel for pain, no clicking  (+) Thessaly  (-) varus at 0 and 30 degrees flexion  (-) valgus at 0 and 30 degrees flexion  (-) patellar apprehension  (-) dial test  Neuro  - no sensory or motor deficit, grossly normal coordination, normal muscle tone  Skin  - no ecchymosis, erythema, warmth, or induration, no obvious rash  Psych  - interactive, appropriate, normal mood and affect    Assessment:   29 year old female with acute on chronic pain of right knee.  Suspected meniscal pathology.  This is her 3rd or 4th painful episode.  No resolution of symptoms with multiple courses of physical therapy, bracing, ice, and antiinflammatories. MRI is indicated at this point.    Plan:   - Continue bracing, WBAT  - Ice, analgesics  - Knee MRI  - Follow-up afterwards to review results and discuss future plan    It was a pleasure seeing aYny today.    Rachid Alvarado DO, [facility]  Primary Care Sports Medicine     I, Rachid Alvarado DO, have reviewed the above note and agree with the scribe's notation as written.    Scribe Disclosure:  I, Billie Cano, am serving as a scribe to document services personally performed by Rachid Alvarado DO at this visit, based upon the provider's statements to me. All documentation has been reviewed by the aforementioned provider prior to being entered into the official medical record.                   SPORTS & ORTHOPEDIC WALK-IN FOLLOW-UP VISIT 11/5/2019    Interval History:     Follow up reason: Right knee injury    Date of injury: 8/8/19    Date last seen: 8/13/19    Following Therapeutic Plan: Yes     Pain: Worsening    Function: Worsening    Interval History:  She re injured knee on  Thursday during a soccer game. Since the last time she was seen she had no issues or pain and was able to play soccer normally. She reports that she felt a pop when she re-injured. Pain is located on medial side and posterolateral aspect of knee. She is interested in an MRI.     Medical History:    Any recent changes to your medical history? No    Any new medication prescribed since last visit? No    Review of Systems:    Do you have fever, chills, weight loss? No    Do you have any vision problems? No    Do you have any chest pain or edema? No    Do you have any shortness of breath or wheezing?  No    Do you have stomach problems? No    Do you have any numbness or focal weakness? No    Do you have diabetes? No    Do you have problems with bleeding or clotting? No    Do you have an rashes or other skin lesions? No

## 2019-11-05 NOTE — PROGRESS NOTES
"[facility]  Orthopedics  Rachid Alvarado, DO  2019     Name: Yany Tolliver  MRN: [id number]  Age: 29 year old  : 1990  Referring provider: Referred Self     Chief Complaint: Pain of the Right Knee    Date of Injury: 19, re-injury 10/31/19    History of Present Illness:   Yany Tolliver is a 29 year old female with a history of partial right ACL tear who presents today for follow-up regarding right knee pain.  The patient was last seen on 19 after a new injury to her right knee while playing soccer.  MRI was recommended to evaluate for possible meniscal injury versus other internal derangement.    She did not complete the MRI as she was feeling better and she had returned to playing soccer without difficulty.  However she re-injured her knee 5 days playing soccer.  She put her right leg out to steady herself, causing her knee to deviate laterally.  She has pain along the inside of her knee and slight pain behind her knee.  She is wearing a brace for comfort and feels okay weightbearing when just standing.  She is nervous about pivoting with her knee.  Her knee is not catching, locking, or giving way.    Review of Systems:   A 10-point review of systems was obtained and is negative except for as noted in the HPI.     Physical Examination:  Height 1.702 m (5' 7\"), weight 66.2 kg (146 lb).     General  - normal appearance, in no obvious distress  Musculoskeletal - right knee  - stance: normal gait without limp, no obvious leg length discrepancy  - inspection: no swelling or effusion, normal bone and joint alignment, no obvious deformity  - palpation: Tender over superior aspect of patellar tendon and medial joint line, lateral joint line nontender, patella tendon non-tender, tenderness to palpation at lateral popliteal fossa  - ROM: 0 to 90 degrees  - strength: 5/5 in flexion, 5/5 in extension  - special tests:  (+) Lachman  (-) anterior drawer  (-) posterior drawer  (+) Angel for " pain, no clicking  (+) Thessaly  (-) varus at 0 and 30 degrees flexion  (-) valgus at 0 and 30 degrees flexion  (-) patellar apprehension  (-) dial test  Neuro  - no sensory or motor deficit, grossly normal coordination, normal muscle tone  Skin  - no ecchymosis, erythema, warmth, or induration, no obvious rash  Psych  - interactive, appropriate, normal mood and affect    Assessment:   29 year old female with acute on chronic pain of right knee.  Suspected ACL vs. meniscal pathology.  This is her 3rd or 4th painful episode.  No resolution of symptoms with multiple courses of physical therapy, bracing, ice, and antiinflammatories. MRI is indicated at this point.    Plan:   - Continue bracing, WBAT  - Ice, analgesics  - Knee MRI  - Follow-up afterwards to review results and discuss future plan    It was a pleasure seeing Yany today.    Rachid Alvarado DO, [facility]  Primary Care Sports Medicine     I, Rachid Alvarado DO, have reviewed the above note and agree with the scribe's notation as written.    Scribe Disclosure:  I, Billie Cano, am serving as a scribe to document services personally performed by Rachid Alvarado DO at this visit, based upon the provider's statements to me. All documentation has been reviewed by the aforementioned provider prior to being entered into the official medical record.

## 2019-11-06 ENCOUNTER — ANCILLARY PROCEDURE (OUTPATIENT)
Dept: MRI IMAGING | Facility: CLINIC | Age: 29
End: 2019-11-06
Attending: FAMILY MEDICINE
Payer: COMMERCIAL

## 2019-11-06 DIAGNOSIS — M25.561 ACUTE PAIN OF RIGHT KNEE: ICD-10-CM

## 2019-11-09 ENCOUNTER — OFFICE VISIT (OUTPATIENT)
Dept: ORTHOPEDICS | Facility: CLINIC | Age: 29
End: 2019-11-09
Payer: COMMERCIAL

## 2019-11-09 VITALS — HEIGHT: 67 IN | BODY MASS INDEX: 22.91 KG/M2 | WEIGHT: 146 LBS

## 2019-11-09 DIAGNOSIS — S83.511D COMPLETE TEAR OF RIGHT ACL, SUBSEQUENT ENCOUNTER: Primary | ICD-10-CM

## 2019-11-09 DIAGNOSIS — S83.241D TEAR OF MEDIAL MENISCUS OF RIGHT KNEE, UNSPECIFIED TEAR TYPE, UNSPECIFIED WHETHER OLD OR CURRENT TEAR, SUBSEQUENT ENCOUNTER: ICD-10-CM

## 2019-11-09 DIAGNOSIS — S83.281D ACUTE LATERAL MENISCAL TEAR, RIGHT, SUBSEQUENT ENCOUNTER: ICD-10-CM

## 2019-11-09 ASSESSMENT — MIFFLIN-ST. JEOR: SCORE: 1429.88

## 2019-11-09 NOTE — LETTER
11/9/2019       RE: Yany Tolliver  [address] [place] [address]     Dear Colleague,    Thank you for referring your patient, Yany Tolliver, to the [facility] SPORTS AND ORTHOPAEDIC WALK IN CLINIC at [facility]. Please see a copy of my visit note below.          SPORTS & ORTHOPEDIC WALK-IN FOLLOW-UP VISIT 11/9/2019    Interval History:     Follow up reason: Right knee MRI f/u    Date of injury: 8/8/19    Date last seen: 11/5/19    Following Therapeutic Plan: Yes     Pain: Improving    Function: Improving      Medical History:    Any recent changes to your medical history? No    Any new medication prescribed since last visit? No    Review of Systems:    Do you have fever, chills, weight loss? No    Do you have any vision problems? No    Do you have any chest pain or edema? No    Do you have any shortness of breath or wheezing?  No    Do you have stomach problems? No    Do you have any numbness or focal weakness? No    Do you have diabetes? No    Do you have problems with bleeding or clotting? No    Do you have an rashes or other skin lesions? No             ESTABLISHED PATIENT FOLLOW-UP:  Follow Up of the Right Knee       HISTORY OF PRESENT ILLNESS  Ms. Tolliver is a pleasant 29 year old year old female reported history of partial ACL tear of right knee diagnosed at outside orthopedic clinic who presents to clinic today for follow-up of right knee pain and instability, review of MRI.  Swelling improved in last week.  Still feels unstable at times.  No locking or catching.      Follow up reason: Right knee MRI f/u    Date of injury: 8/8/19    Date last seen: 11/5/19    Following Therapeutic Plan: Yes     Pain: Improving    Function: Improving    Additional medical/Social/Surgical histories reviewed in [facility] and updated as appropriate.    REVIEW OF SYSTEMS (11/9/2019)  CONSTITUTIONAL: Denies fever and weight loss  GASTROINTESTINAL: Denies abdominal pain,  "nausea, vomiting  MUSCULOSKELETAL: See HPI  SKIN: Denies any recent rash or lesion  NEUROLOGICAL: Denies numbness or focal weakness     PHYSICAL EXAM  Ht 1.702 m (5' 7\")   Wt 66.2 kg (146 lb)   BMI 22.87 kg/m       Deferred today    IMAGING :    MR right knee without contrast     Techniques: Multiplanar multisequence imaging of the right  knee was  obtained without  administration of intra-articular or intravenous  contrast using routing protocol.     History: Knee pain, persistent, history partial right ACL tear      Additional History from EMR: None     Comparison: None     Findings:     MENISCI:  Medial meniscus: Vertical tear of the peripheral 2 mm the posterior  horn likely involving the red zone.  Lateral meniscus: Parrot-beak type radial tear of the lateral meniscus  posterior horn.     LIGAMENTS  Cruciate ligaments: Complete tear of the proximal anterior cruciate  ligament with torn distal fragment partly lying in the intercondylar  notch. The posterior cruciate ligament is intact.  Medial supporting structures: Mild edema superficial and deep to the  tibial collateral ligament as well as involving the medial retinaculum  consistent with low-grade sprain.  Lateral supporting structures: Intact.     EXTENSOR MECHANISM  Intact.     FLUID  A small knee joint effusion. No Baker cyst.     OSSEOUS and ARTICULAR STRUCTURES  Bones:      Bone marrow edema involving the lateral terminal sulcus as well as  peripheral aspect of medial femoral condyle. Mild bone marrow edema  involving the posterior medial and lateral tibial plateau. No fracture  is seen. No bone marrow infiltration is seen.     Likely bone islands in the distal femur. 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     Patellofemoral compartment: No hyaline cartilage disease.     Medial compartment: No hyaline cartilage disease.     Lateral compartment: No hyaline cartilage " disease.     ANCILLARY FINDINGS  None.                                                                      Impression:  1.  Complete tear of the proximal anterior cruciate ligament with  associated bony contusion patterns.  2.  Parrot-beak type radial tear of the lateral meniscus posterior  horn.  3.  Vertical tear of the peripheral posterior horn of the medial  meniscus likely involving the red zone.  4.  Additional marrow contusion at the peripheral aspect of medial  femoral condyle.     I have personally reviewed the examination and initial interpretation  and I agree with the findings.     ZARI ORTEZ     ASSESSMENT & PLAN  Ms. Tolliver is a 29 year old year old female reported history of partial tear of right ACL 2018 who presents to clinic today for evaluation of right knee and discussion of MRI.  During previous visits with multiple providers, MRI x 3 were ordered, but due to improvement she did not pursue.  MRI today revealing ACL tear of right knee and meniscal tears. After discussion, she would like to continue to play soccer at a high level and will refer her on to sports knee surgeon for consultation of ACL reconstruction.    1. Complete tear of ACL of right knee  2. Tear of lateral meniscus of right knee  3. Tear of medial meniscus of right knee     Follow up PRN    It was a pleasure seeing Asiya Alvarado DO, [facility]  Primary Care Sports Medicine          Again, thank you for allowing me to participate in the care of your patient.      Sincerely,    Rachid Alvarado DO

## 2019-11-09 NOTE — PROGRESS NOTES
SPORTS & ORTHOPEDIC WALK-IN FOLLOW-UP VISIT 11/9/2019    Interval History:     Follow up reason: Right knee MRI f/u    Date of injury: 8/8/19    Date last seen: 11/5/19    Following Therapeutic Plan: Yes     Pain: Improving    Function: Improving      Medical History:    Any recent changes to your medical history? No    Any new medication prescribed since last visit? No    Review of Systems:    Do you have fever, chills, weight loss? No    Do you have any vision problems? No    Do you have any chest pain or edema? No    Do you have any shortness of breath or wheezing?  No    Do you have stomach problems? No    Do you have any numbness or focal weakness? No    Do you have diabetes? No    Do you have problems with bleeding or clotting? No    Do you have an rashes or other skin lesions? No

## 2019-11-09 NOTE — LETTER
Date:November 12, 2019      Patient was self referred, no letter generated. Do not send.        [facility] Information

## 2019-11-09 NOTE — PROGRESS NOTES
"ESTABLISHED PATIENT FOLLOW-UP:  Follow Up of the Right Knee       HISTORY OF PRESENT ILLNESS  Ms. Tolliver is a pleasant 29 year old year old female reported history of partial ACL tear of right knee diagnosed at outside orthopedic clinic who presents to clinic today for follow-up of right knee pain and instability, review of MRI.  Swelling improved in last week.  Still feels unstable at times.  No locking or catching.      Follow up reason: Right knee MRI f/u    Date of injury: 8/8/19    Date last seen: 11/5/19    Following Therapeutic Plan: Yes     Pain: Improving    Function: Improving    Additional medical/Social/Surgical histories reviewed in [facility] and updated as appropriate.    REVIEW OF SYSTEMS (11/9/2019)  CONSTITUTIONAL: Denies fever and weight loss  GASTROINTESTINAL: Denies abdominal pain, nausea, vomiting  MUSCULOSKELETAL: See HPI  SKIN: Denies any recent rash or lesion  NEUROLOGICAL: Denies numbness or focal weakness     PHYSICAL EXAM  Ht 1.702 m (5' 7\")   Wt 66.2 kg (146 lb)   BMI 22.87 kg/m      Deferred today    IMAGING :    MR right knee without contrast     Techniques: Multiplanar multisequence imaging of the right  knee was  obtained without  administration of intra-articular or intravenous  contrast using routing protocol.     History: Knee pain, persistent, history partial right ACL tear      Additional History from EMR: None     Comparison: None     Findings:     MENISCI:  Medial meniscus: Vertical tear of the peripheral 2 mm the posterior  horn likely involving the red zone.  Lateral meniscus: Parrot-beak type radial tear of the lateral meniscus  posterior horn.     LIGAMENTS  Cruciate ligaments: Complete tear of the proximal anterior cruciate  ligament with torn distal fragment partly lying in the intercondylar  notch. The posterior cruciate ligament is intact.  Medial supporting structures: Mild edema superficial and deep to the  tibial collateral ligament as well as involving the medial " retinaculum  consistent with low-grade sprain.  Lateral supporting structures: Intact.     EXTENSOR MECHANISM  Intact.     FLUID  A small knee joint effusion. No Baker cyst.     OSSEOUS and ARTICULAR STRUCTURES  Bones:      Bone marrow edema involving the lateral terminal sulcus as well as  peripheral aspect of medial femoral condyle. Mild bone marrow edema  involving the posterior medial and lateral tibial plateau. No fracture  is seen. No bone marrow infiltration is seen.     Likely bone islands in the distal femur. 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     Patellofemoral compartment: No hyaline cartilage disease.     Medial compartment: No hyaline cartilage disease.     Lateral compartment: No hyaline cartilage disease.     ANCILLARY FINDINGS  None.                                                                      Impression:  1.  Complete tear of the proximal anterior cruciate ligament with  associated bony contusion patterns.  2.  Parrot-beak type radial tear of the lateral meniscus posterior  horn.  3.  Vertical tear of the peripheral posterior horn of the medial  meniscus likely involving the red zone.  4.  Additional marrow contusion at the peripheral aspect of medial  femoral condyle.     I have personally reviewed the examination and initial interpretation  and I agree with the findings.     ZARI ORTEZ     ASSESSMENT & PLAN  Ms. Tolliver is a 29 year old year old female reported history of partial tear of right ACL 2018 who presents to clinic today for evaluation of right knee and discussion of MRI.  During previous visits with multiple providers, MRI x 3 were ordered, but due to improvement she did not pursue.  MRI today revealing ACL tear of right knee and meniscal tears. After discussion, she would like to continue to play soccer at a high level and will refer her on to sports knee surgeon for consultation of ACL  reconstruction.    1. Complete tear of ACL of right knee  2. Tear of lateral meniscus of right knee  3. Tear of medial meniscus of right knee     Follow up PRN    It was a pleasure seeing Asiya Alvarado DO, [facility]  Primary Care Sports Medicine

## 2019-11-11 NOTE — TELEPHONE ENCOUNTER
DIAGNOSIS: Right knee pain (soccer injury)   11/11   NOTES STATUS DETAILS   OFFICE NOTE from referring provider N/A    OFFICE NOTE from other specialist Internal/care everwhere hp   DISCHARGE SUMMARY from hospital N/A    DISCHARGE REPORT from the ER     OPERATIVE REPORT N/A    MEDICATION LIST Internal    MRI recieved  3/6/19   CT SCAN N/A    XRAYS (IMAGES & REPORTS) recieved  3/2/18     Called  they are pushing imaging shortly 11/11/19

## 2019-11-13 ENCOUNTER — OFFICE VISIT (OUTPATIENT)
Dept: ORTHOPEDICS | Facility: CLINIC | Age: 29
End: 2019-11-13
Payer: COMMERCIAL

## 2019-11-13 ENCOUNTER — PRE VISIT (OUTPATIENT)
Dept: ORTHOPEDICS | Facility: CLINIC | Age: 29
End: 2019-11-13

## 2019-11-13 VITALS — BODY MASS INDEX: 22.76 KG/M2 | HEIGHT: 67 IN | WEIGHT: 145 LBS

## 2019-11-13 DIAGNOSIS — M25.561 ACUTE PAIN OF RIGHT KNEE: Primary | ICD-10-CM

## 2019-11-13 ASSESSMENT — MIFFLIN-ST. JEOR: SCORE: 1425.35

## 2019-11-13 NOTE — NURSING NOTE
Teaching Flowsheet   Relevant Diagnosis: Right knee ACL Reconstrucion  Teaching Topic: Pre operative care     Person(s) involved in teaching:   Patient and      Motivation Level:  Asks Questions: Yes  Eager to Learn: Yes  Cooperative: Yes  Receptive (willing/able to accept information): Yes  Any cultural factors/Voodoo beliefs that may influence understanding or compliance? No     Patient and Family demonstrates understanding of the following:  Reason for the appointment, diagnosis and treatment plan: Yes  Knowledge of proper use of medications and conditions for which they are ordered (with special attention to potential side effects or drug interactions): Yes  Which situations necessitate calling provider and whom to contact: Yes     Teaching Concerns Addressed:   Comments: Pre operative care     Proper use and care of brace and crutches (medical equip, care aids, etc.): No, explain: will be supplied at surgery  Nutritional needs and diet plan: NA  Pain management techniques: Yes  Wound Care: Yes  How and/when to access community resources: NA     Instructional Materials Used/Given: Pre operative care instructions, surgical soap     Time spent with patient: 15 minutes.    **Patient will check with insurance regarding coverage before scheduling. She will call when she is ready to proceed**  **Will begin pre-hab physical therapy until she is ready to schedule surgery**    a. Does the patient take five or more prescription medications? No  b. Does patient have difficulty walking up two flights of stairs? No  c. Is patient s BMI 35 or greater? No  d. Is patient an insulin dependent diabetic? No  e. Does patient have a history of having a heart attack or a heart surgery of any kind? No  f. Does patient have a history of heart failure? No  g. Does the patient have a history of any type of transplant? No  h. Does the patient use inhalers on a daily basis? No  i. Does the patient have a history of pulmonary  hypertension? No  Once the patient is evaluated by PAC contact (ex: Surgery schedulers name and number, RN's name and number, etc..);  Crystal, [phone]  Name of planned surgery______________________________

## 2019-11-13 NOTE — LETTER
11/13/2019      RE: Yany Tolliver  [address] [place] [address]       CHIEF CONCERN: Right knee ACL tear    HISTORY:   Very pleasant 29-year-old female.  Sustained an injury to her knee on August 8, 2019.  She was playing soccer which is her normal desired activities.  Sustained an injury to her knee.  Felt a pop.  Unable to continue to play.  Was seen through our walk-in clinic where she was diagnosed with a rupture of her ACL after an MRI was obtained and she was referred to my clinic for definitive management.  She has a remote history of a partial injury to her ACL.  No surgery was undertaken at that time.    PAST MEDICAL HISTORY: (Reviewed with the patient and in the [facility] medical record)  1. None    PAST SURGICAL HISTORY: (Reviewed with the patient and in the [facility] medical record)  1. None    MEDICATIONS: (Reviewed with the patient and in the [facility] medical record)    Notable medications include: No blood thinners or narcotics    ALLERGIES: (Reviewed with the patient and in the [facility] medical record)  1. 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 Articulate and Interactive    Body mass index is 22.71 kg/m .    Right lower extremity :    Skin is Warm and Well perfused, no suggestion of infection    Range of motion is 0 to 135 degrees    Stable to varus and valgus stress testing stable posterior  drawer testing 2+ anterior drawer    Lachman is 2B.  1+ pivot shift    1 quadrant medial lateral translation patella.  Tilt to neutral.    EHL/FHL/TA/GS 5/5    Sensation intact L3-S1    2+ Dorsalis Pedis Pulse    IMAGING:    Plain Radiographs: No fractures dislocations    MRI: Grade 3 rupture of the anterior cruciate ligament, medial meniscus tear, lateral meniscus tear    ASSESSMENT:  1. Grade 3 rupture of the anterior cruciate ligament  2. Medial meniscus te lateral meniscus tear ar  3.     PLAN:  1. This time a long discussion with the patient I discussed with the her diagnosis of ACL tear  2. I reviewed with her the risk benefits comminution techniques and alternatives to surgery.  We reviewed the expected course of recovery and alternative treatment options.  3. Together through a combined decision making approach we elected to proceed with ACL reconstruction bone tendon bone autograft medial and lateral meniscus repair versus meniscectomy    I discussed with the patient the risks, benefits, complications and techniques of surgery as well as the natural history of ACL tears, meniscus tears and the alternative treatment options.    The risks include, but are not limited to the risk of death and risk of a myocardial infarction, risk of bleeding and a risk of infection, risk of nerve damage and a risk of muscle damage, stiffness, instability, continued pain or worsening pain and re-tear of her ACL, need for future surgery, continued symptoms, inability to return to sports, re-tear of her meniscus..    The patient was provided an opportunity to ask questions and these were answered.         Stefan Bhat MD

## 2019-11-14 NOTE — PROGRESS NOTES
CHIEF CONCERN: Right knee ACL tear    HISTORY:   Very pleasant 29-year-old female.  Sustained an injury to her knee on August 8, 2019.  She was playing soccer which is her normal desired activities.  Sustained an injury to her knee.  Felt a pop.  Unable to continue to play.  Was seen through our walk-in clinic where she was diagnosed with a rupture of her ACL after an MRI was obtained and she was referred to my clinic for definitive management.  She has a remote history of a partial injury to her ACL.  No surgery was undertaken at that time.    PAST MEDICAL HISTORY: (Reviewed with the patient and in the [facility] medical record)  1. None    PAST SURGICAL HISTORY: (Reviewed with the patient and in the [facility] medical record)  1. None    MEDICATIONS: (Reviewed with the patient and in the EP[facility] medical record)    Notable medications include: No blood thinners or narcotics    ALLERGIES: (Reviewed with the patient and in the EP[facility] medical record)  1. 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 Articulate and Interactive    Body mass index is 22.71 kg/m .    Right lower extremity :    Skin is Warm and Well perfused, no suggestion of infection    Range of motion is 0 to 135 degrees    Stable to varus and valgus stress testing stable posterior drawer testing 2+ anterior drawer    Lachman is 2B.  1+ pivot shift    1 quadrant medial lateral  translation patella.  Tilt to neutral.    EHL/FHL/TA/GS 5/5    Sensation intact L3-S1    2+ Dorsalis Pedis Pulse    IMAGING:    Plain Radiographs: No fractures dislocations    MRI: Grade 3 rupture of the anterior cruciate ligament, medial meniscus tear, lateral meniscus tear    ASSESSMENT:  1. Grade 3 rupture of the anterior cruciate ligament  2. Medial meniscus te lateral meniscus tear ar  3.     PLAN:  1. This time a long discussion with the patient I discussed with the her diagnosis of ACL tear  2. I reviewed with her the risk benefits comminution techniques and alternatives to surgery.  We reviewed the expected course of recovery and alternative treatment options.  3. Together through a combined decision making approach we elected to proceed with ACL reconstruction bone tendon bone autograft medial and lateral meniscus repair versus meniscectomy    I discussed with the patient the risks, benefits, complications and techniques of surgery as well as the natural history of ACL tears, meniscus tears and the alternative treatment options.    The risks include, but are not limited to the risk of death and risk of a myocardial infarction, risk of bleeding and a risk of infection, risk of nerve damage and a risk of muscle damage, stiffness, instability, continued pain or worsening pain and re-tear of her ACL, need for future surgery, continued symptoms, inability to return to sports, re-tear of her meniscus..    The patient was provided an opportunity to ask questions and these were answered.

## 2019-12-05 ENCOUNTER — THERAPY VISIT (OUTPATIENT)
Dept: PHYSICAL THERAPY | Facility: CLINIC | Age: 29
End: 2019-12-05
Attending: ORTHOPAEDIC SURGERY
Payer: COMMERCIAL

## 2019-12-05 DIAGNOSIS — M25.561 ACUTE PAIN OF RIGHT KNEE: ICD-10-CM

## 2019-12-05 DIAGNOSIS — S83.511D NEW ACL TEAR, RIGHT, SUBSEQUENT ENCOUNTER: ICD-10-CM

## 2019-12-05 PROCEDURE — 97161 PT EVAL LOW COMPLEX 20 MIN: CPT | Mod: GP | Performed by: PHYSICAL THERAPIST

## 2019-12-05 PROCEDURE — 97110 THERAPEUTIC EXERCISES: CPT | Mod: GP | Performed by: PHYSICAL THERAPIST

## 2019-12-05 PROCEDURE — 97530 THERAPEUTIC ACTIVITIES: CPT | Mod: GP | Performed by: PHYSICAL THERAPIST

## 2019-12-05 ASSESSMENT — ACTIVITIES OF DAILY LIVING (ADL)
WEAKNESS: THE SYMPTOM AFFECTS MY ACTIVITY SEVERELY
RISE FROM A CHAIR: ACTIVITY IS NOT DIFFICULT
KNEE_ACTIVITY_OF_DAILY_LIVING_SCORE: 70
SIT WITH YOUR KNEE BENT: ACTIVITY IS MINIMALLY DIFFICULT
RAW_SCORE: 49
STAND: ACTIVITY IS MINIMALLY DIFFICULT
GO DOWN STAIRS: ACTIVITY IS SOMEWHAT DIFFICULT
SWELLING: I HAVE THE SYMPTOM BUT IT DOES NOT AFFECT MY ACTIVITY
SQUAT: ACTIVITY IS MINIMALLY DIFFICULT
STIFFNESS: THE SYMPTOM AFFECTS MY ACTIVITY SLIGHTLY
AS_A_RESULT_OF_YOUR_KNEE_INJURY,_HOW_WOULD_YOU_RATE_YOUR_CURRENT_LEVEL_OF_DAILY_ACTIVITY?: ABNORMAL
WALK: ACTIVITY IS SOMEWHAT DIFFICULT
GIVING WAY, BUCKLING OR SHIFTING OF KNEE: I DO NOT HAVE THE SYMPTOM
KNEE_ACTIVITY_OF_DAILY_LIVING_SUM: 49
LIMPING: I HAVE THE SYMPTOM BUT IT DOES NOT AFFECT MY ACTIVITY
GO UP STAIRS: ACTIVITY IS SOMEWHAT DIFFICULT
HOW_WOULD_YOU_RATE_THE_CURRENT_FUNCTION_OF_YOUR_KNEE_DURING_YOUR_USUAL_DAILY_ACTIVITIES_ON_A_SCALE_FROM_0_TO_100_WITH_100_BEING_YOUR_LEVEL_OF_KNEE_FUNCTION_PRIOR_TO_YOUR_INJURY_AND_0_BEING_THE_INABILITY_TO_PERFORM_ANY_OF_YOUR_USUAL_DAILY_ACTIVITIES?: 30
KNEEL ON THE FRONT OF YOUR KNEE: ACTIVITY IS MINIMALLY DIFFICULT
HOW_WOULD_YOU_RATE_THE_OVERALL_FUNCTION_OF_YOUR_KNEE_DURING_YOUR_USUAL_DAILY_ACTIVITIES?: NEARLY NORMAL
PAIN: THE SYMPTOM AFFECTS MY ACTIVITY MODERATELY

## 2019-12-05 NOTE — PROGRESS NOTES
"Physical Therapy Initial Evaluation: Subjective History  Anticipated Date of Surgery: January 2020 .    Proposed Surgical Procedure/Limb: RIGHT ACL-R with BTB, meniscus repair vs. Menisectomy  Surgeon Name: Dr. Bhat  Average Daily Pain Levels: 2-3/10 (Location: Medial knee pain, medial sided patella; Quality: Aching/Throbbing)  Other Symptoms: Gradually is getting better, it feeling more movement.   Symptom Mgmt Strategies: Ice, activity modification  Prior orthopaedic history/procedures: None  Prior non-operative management: See Epic - multiple episodes of PT for right knee pain  Next MD Appt Date: Surgery date - no anticipated visits prior to surgery    Functional limitations since injury: Walking, \"is careful with it\", previously having issues with stairs (better over the past week), sleeping is OK.   Previous level of function: Independent with all activities      Patient Employment: [employer]  Typical Physical Activities: Has not done anything recently - Work out videos, running outside.     Pre-Operative Physical Therapy Examination    Physical Mobility Status  Gait: Mild antalgic gait pattern, lacking terminal knee extension  Transfers:  Independent    Anthropometric Measures     Right Left Difference   Joint ROM      Hyperextension 3 deg 3 deg 0 deg   Extension 0 deg 0 deg 0 deg   Flexion 135 deg  PAIN 140 deg 5 deg         Effusion TRACE none        Quadriceps Muscle Activation Right Left   Isometric Quad Activation Delayed activation and Depressed intensity Normal   Straight Leg Raising Extensor lag of 3 deg No extensor lag     Dynamic Movement Screen  Double Leg Squat: Good form, equal balance of hip/knee motion - did report pain with reps  Single Leg Squat: Deferred     Right Left   Max Single Leg Squat Depth - degrees NEXT degrees   Single Leg Stance  - seconds NEXT seconds       Assessment/Plan  Giana presents to physical therapy with an ACL tear and meniscus tears. She had many questions " about the involved anatomy and current restrictions and precautions. Much of our visit today was spent in education and discussion on the plan of care. She has a relatively happy knee with full ROM and only a trace effusion. She should continue to chose activities to promote quad activation and strength as well as maintain a quiet knee. She had her questions answered and will follow-up prior to surgery.     Patient is a 29 year old female with right side knee complaints.    Patient has the following significant findings with corresponding treatment plan.                Diagnosis 1:  ACL tear  Pain -  hot/cold therapy, manual therapy, STS, splint/taping/bracing/orthotics, self management and education  Decreased ROM/flexibility - manual therapy, therapeutic exercise and home program  Decreased strength - therapeutic exercise, therapeutic activities and home program  Impaired balance - neuro re-education, therapeutic activities and home program  Edema - vasopneumatics and cold therapy  Impaired gait - gait training and home program  Impaired muscle performance - neuro re-education and home program  Decreased function - therapeutic activities and home program    Therapy Evaluation Codes:   1) History comprised of:   Personal factors that impact the plan of care:      None.    Comorbidity factors that impact the plan of care are:      None.     Medications impacting care: None.  2) Examination of Body Systems comprised of:   Body structures and functions that impact the plan of care:      Knee.   Activity limitations that impact the plan of care are:      Running, Sports, Squatting/kneeling and Stairs.  3) Clinical presentation characteristics are:   Stable/Uncomplicated.  4) Decision-Making    Low complexity using standardized patient assessment instrument and/or measureable assessment of functional outcome.  Cumulative Therapy Evaluation is: Low complexity.    Previous and current functional limitations:  (See Goal  Flow Sheet for this information)    Short term and Long term goals: (See Goal Flow Sheet for this information)     Communication ability:  Patient appears to be able to clearly communicate and understand verbal and written communication and follow directions correctly.  Treatment Explanation - The following has been discussed with the patient:   RX ordered/plan of care  Anticipated outcomes  Possible risks and side effects  This patient would benefit from PT intervention to resume normal activities.   Rehab potential is good.    Frequency:  1 X week, once daily  Duration:  for 4 weeks  Discharge Plan:  Achieve all LTG.  Independent in home treatment program.  Reach maximal therapeutic benefit.    Please refer to the daily flowsheet for treatment today, total treatment time and time spent performing 1:1 timed codes.

## 2020-01-02 ENCOUNTER — PREP FOR PROCEDURE (OUTPATIENT)
Dept: ORTHOPEDICS | Facility: CLINIC | Age: 30
End: 2020-01-02

## 2020-01-02 ENCOUNTER — TELEPHONE (OUTPATIENT)
Dept: ORTHOPEDICS | Facility: CLINIC | Age: 30
End: 2020-01-02

## 2020-01-02 DIAGNOSIS — M25.561 ACUTE PAIN OF RIGHT KNEE: Primary | ICD-10-CM

## 2020-01-02 NOTE — TELEPHONE ENCOUNTER
Patient is scheduled for surgery with Dr. Bhat    Spoke or left message with: Patient    Date of Surgery: 2/4/20    Location: [facility]    Post op: 1 week, scheduled    Pre-op with surgeon (if applicable): Complete    H&P: Scheduled with [facility] NP clinic 1/20/20    Additional imaging/appointments: PT scheduled for 1/7/20    Surgery packet: Received in clinic     Additional comments: N/A

## 2020-01-20 ENCOUNTER — OFFICE VISIT (OUTPATIENT)
Dept: FAMILY MEDICINE | Facility: CLINIC | Age: 30
End: 2020-01-20
Payer: COMMERCIAL

## 2020-01-20 VITALS
BODY MASS INDEX: 22.76 KG/M2 | TEMPERATURE: 98 F | WEIGHT: 145 LBS | OXYGEN SATURATION: 100 % | HEIGHT: 67 IN | HEART RATE: 68 BPM | RESPIRATION RATE: 16 BRPM | SYSTOLIC BLOOD PRESSURE: 132 MMHG | DIASTOLIC BLOOD PRESSURE: 77 MMHG

## 2020-01-20 DIAGNOSIS — S83.241D OTHER TEAR OF MEDIAL MENISCUS OF RIGHT KNEE AS CURRENT INJURY, SUBSEQUENT ENCOUNTER: ICD-10-CM

## 2020-01-20 DIAGNOSIS — G89.29 CHRONIC PAIN OF RIGHT KNEE: Primary | ICD-10-CM

## 2020-01-20 DIAGNOSIS — Z01.818 PRE-OP EXAM: ICD-10-CM

## 2020-01-20 DIAGNOSIS — M25.561 CHRONIC PAIN OF RIGHT KNEE: Primary | ICD-10-CM

## 2020-01-20 DIAGNOSIS — S83.511D RUPTURE OF ANTERIOR CRUCIATE LIGAMENT OF RIGHT KNEE, SUBSEQUENT ENCOUNTER: ICD-10-CM

## 2020-01-20 LAB
HGB BLD-MCNC: 14.2 G/DL (ref 11.7–15.7)
POTASSIUM SERPL-SCNC: 4.5 MMOL/L (ref 3.4–5.3)

## 2020-01-20 ASSESSMENT — PATIENT HEALTH QUESTIONNAIRE - PHQ9
SUM OF ALL RESPONSES TO PHQ QUESTIONS 1-9: 20
10. IF YOU CHECKED OFF ANY PROBLEMS, HOW DIFFICULT HAVE THESE PROBLEMS MADE IT FOR YOU TO DO YOUR WORK, TAKE CARE OF THINGS AT HOME, OR GET ALONG WITH OTHER PEOPLE: VERY DIFFICULT
SUM OF ALL RESPONSES TO PHQ QUESTIONS 1-9: 20

## 2020-01-20 ASSESSMENT — MIFFLIN-ST. JEOR: SCORE: 1425.35

## 2020-01-20 ASSESSMENT — PAIN SCALES - GENERAL: PAINLEVEL: NO PAIN (0)

## 2020-01-20 NOTE — LETTER
RE: Yany Tolliver  [address] [place] [address]        [facility]  [address]  5th Floor  [facility] [id number] [phone]  Dept: [phone]    PRE-OP EVALUATION:  01/20/20    Yany Tolliver is 29 year old female presents for pre-operative evaluation assessment as requested by Dr. Bhat. She requires evaluation and anesthesia risk assessment prior to undergoing surgery/procedure for treatment of Torn ACL and meniscus right leg.     History of Present Illness   HPI related to upcoming procedure: Healthy 29 year old not currently taking any medications.     See problem list for active medical problems.  Problems all longstanding and stable, except as noted/documented.  See ROS for pertinent symptoms related to these conditions.    Surgery Information     Primary Care Provider: No Ref-Primary, Physician  Proposed Surgery/Procedure: Examination under anesthesia Right knee, Right anterior cruciate ligament reconstruction bone tendon bone autograft; Right meniscus surgery  Date of Surgery/Procedure: 02/04/2020  Time of Surgery/Procedure:0800  Hospital/Surgical Facility:[facility]   Type of Anesthesia Anticipated: General    Preoperative Screening Questions     Patient has a Health Care Directive or Living Will:  NO    Preop Questions 1/20/2020   Who is doing your surgery? Dr. Bhat   What are you having done? ACL reconstruction, meniscal repair   Date of Surgery/Procedure: February 4th, 2020   Facility or Hospital where procedure[facility]surgery [facility] be performed[facility]   1.  Do you have a history of Heart attack, stroke, stent, coronary bypass surgery, or other heart surgery? No   2.  Do you ever have any pain or discomfort in your chest? No   3.  Do you have a history of  Heart Failure? No   4.   Are you troubled by shortness of breath when:  walking on a level surface, or up a slight hill, or at night? No   5.  Do you  currently have a cold, bronchitis or other respiratory infection? No   6.  Do you have a cough, shortness of breath, or wheezing? No   7.  Do you sometimes get pains in the calves of your legs when you walk? No   8. Do you or anyone in your family have previous history of blood clots? YES - aunt, and paternal grandmother   9.  Do you or does anyone in your family have a serious bleeding problem such as prolonged bleeding following surgeries or cuts? No   10. Have you ever had problems with anemia or been told to take iron pills? YES - low iron as a child and teen   11. Have you had any abnormal blood loss such as black, tarry or bloody stools, or abnormal vaginal bleeding? No   12. Have you ever had a blood transfusion? No   13. Have you or any of your relatives ever had problems with anesthesia? UNKNOWN - has not had   14. Do you have sleep apnea, excessive snoring or daytime drowsiness? No   15. Do you have any prosthetic heart valves? No   16. Do you have prosthetic joints? No   17. Is there any chance that you may be pregnant? No     Audit C Alcohol Screening Tool  AUDIT   Questions 0 1 2 3 4 Score   1. How often do you have a drink  containing alcohol? Never Monthly or less 2 to 4  times a  month 2 to 3  times a  week 4 or more  times a  week 2   2. How many drinks containing alcohol  do you have on a typical day when you are drinking? 1 or 2 3 or 4 5 or 6 7 to 9 10 or more 0   3. How often do you have more than five  or more drinks on one occasion? Never Less  than  monthly Monthly Weekly Daily or  almost  daily 1   Scoring:   A score of 4 for adult men or 3 for adult women is an indication of hazardous drinking (risk for physical or physiological harm).   A score of 5 or more for adult men or 4 or more for adult women is an indication of an alcohol use disorder.      Medical History   No past medical history on file.    No past surgical history on file.      Social History     Tobacco Use     Smoking status:  "Never Smoker     Smokeless tobacco: Never Used   Substance Use Topics     Alcohol use: Not on file     History   Drug Use Not on file       Current Outpatient Medications   Medication     ibuprofen (ADVIL) 200 MG capsule     No current facility-administered medications for this visit.        OTC products: None, except as noted above    No Known Allergies    Latex Allergy: NO      Review Of Systems   Constitutional, neuro, ENT, endocrine, pulmonary, cardiac, gastrointestinal, genitourinary, musculoskeletal (right knee pain), integument and psychiatric systems are negative, except as otherwise noted.    Exam     Patient Vitals for the past 24 hrs:   BP Temp Temp src Pulse Resp SpO2 Height Weight   01/20/20 1232 132/77 98  F (36.7  C) Oral 68 16 100 % 1.702 m (5' 7\") 65.8 kg (145 lb)     Body mass index is 22.71 kg/m .    GENERAL APPEARANCE: healthy, alert and no distress     EYES: EOMI, PERRL     HENT: ear canals and TM's normal and nose and mouth without ulcers or lesions     NECK: no adenopathy, no asymmetry, masses, or scars and thyroid normal to palpation     RESP: lungs clear to auscultation - no rales, rhonchi or wheezes     CV: regular rates and rhythm, normal S1 S2, no S3 or S4 and no murmur, click or rub     ABDOMEN:  soft, nontender, no HSM or masses and bowel sounds normal     MS: extremities normal- no gross deformities noted, no evidence of inflammation in joints, FROM in all extremities.     SKIN: no suspicious lesions or rashes     NEURO: Normal strength and tone, sensory exam grossly normal, mentation intact and speech normal     PSYCH: mentation appears normal. and affect normal/bright     LYMPHATICS: No cervical adenopathy    Diagnostics   No EKG required for healthy 29 year old woman.   She will have labs completed.       Answers for HPI/ROS submitted by the patient on 1/20/2020   If you checked off any problems, how difficult have these problems made it for you to do your work, take care of things " at home, or get along with other people?: Very difficult  PHQ9 TOTAL SCORE: 20  **I talked with Giana about her PHQ 9 score. She is seeking a Psychiatrist. She will return to see me if she needs assistance. She feels safe, this sadness has been ongoing.      Risk Assessment     Surgical Risk:  The proposed surgical procedure is considered LOW risk.    Revised Cardiac Risk Index  The patient has the following serious cardiovascular risks for perioperative complications such as (MI, PE, VFib and 3  AV Block):  No serious cardiac risks    INTERPRETATION: 0 risks: Class I (very low risk - 0.4% complication rate)    [facility] Activity Status Index  Total Points:  Total METs:    Functional capacity is classified as:  Excellent: >10 METs  Good: 7-10 METs   Moderate: 4 - 6 METs  Poor: <4 METs    The patient has the following additional risks for perioperative complications:  No identified additional risks    Component      Latest Ref Rng & Units 1/20/2020   Potassium      3.4 - 5.3 mmol/L 4.5   Hemoglobin      11.7 - 15.7 g/dL 14.2       Impression     For above listed surgery and anesthesia:   Patient is at low risk for surgery/procedure and perioperative/procedure complications.    Recommendations     --Patient is on no chronic medications      ICD-10-CM    1. Chronic pain of right knee M25.561     G89.29    2. Rupture of anterior cruciate ligament of right knee, subsequent encounter S83.511D    3. Other tear of medial meniscus of right knee as current injury, subsequent encounter S83.241D    4. Pre-op exam Z01.818 Hemoglobin     Potassium       Proceed with surgery as planned.    Patient is at low risk for surgery/procedure and perioperative/procedure complications.    A copy of this evaluation report will be provided to requesting physician.   Please contact our office if there are any further questions or information required about this patient.    [place] Preop Guidelines    ACC/AHA Guidelines    NEVIN Michelle,  CNP

## 2020-01-20 NOTE — PATIENT INSTRUCTIONS

## 2020-01-20 NOTE — NURSING NOTE
Chief Complaint   Patient presents with     Pre-Op Exam     Pt comes in for a pre op exam.         TR Blanco on 1/20/2020 at 12:32 PM

## 2020-01-20 NOTE — PROGRESS NOTES
[facility]  [address]  5th [address]  [facility] [id number] [phone]  Dept: [phone]    PRE-OP EVALUATION:  01/20/20    Yany Tolliver is 29 year old female presents for pre-operative evaluation assessment as requested by Dr. Bhat. She requires evaluation and anesthesia risk assessment prior to undergoing surgery/procedure for treatment of Torn ACL and meniscus right leg.     History of Present Illness   HPI related to upcoming procedure: Healthy 29 year old not currently taking any medications.     See problem list for active medical problems.  Problems all longstanding and stable, except as noted/documented.  See ROS for pertinent symptoms related to these conditions.      Surgery Information     Primary Care Provider: No Ref-Primary, Physician  Proposed Surgery/Procedure: Examination under anesthesia Right knee, Right anterior cruciate ligament reconstruction bone tendon bone autograft; Right meniscus surgery  Date of Surgery/Procedure: 02/04/2020  Time of Surgery/Procedure:0800  Hospital/Surgical Facility:[facility]   Type of Anesthesia Anticipated: General    Preoperative Screening Questions     Patient has a Health Care Directive or Living Will:  NO    Preop Questions 1/20/2020   Who is doing your surgery? Dr. Bhat   What are you having done? ACL reconstruction, meniscal repair   Date of Surgery/Procedure: February 4th, 2020   [facility]   1.  Do you have a history of Heart attack, stroke, stent, coronary bypass surgery, or other heart surgery? No   2.  Do you ever have any pain or discomfort in your chest? No   3.  Do you have a history of  Heart Failure? No   4.   Are you troubled by shortness of breath when:  walking on a level surface, or up a slight hill, or at night? No   5.  Do you currently have a cold, bronchitis or other respiratory infection? No   6.  Do you  have a cough, shortness of breath, or wheezing? No   7.  Do you sometimes get pains in the calves of your legs when you walk? No   8. Do you or anyone in your family have previous history of blood clots? YES - aunt, and paternal grandmother   9.  Do you or does anyone in your family have a serious bleeding problem such as prolonged bleeding following surgeries or cuts? No   10. Have you ever had problems with anemia or been told to take iron pills? YES - low iron as a child and teen   11. Have you had any abnormal blood loss such as black, tarry or bloody stools, or abnormal vaginal bleeding? No   12. Have you ever had a blood transfusion? No   13. Have you or any of your relatives ever had problems with anesthesia? UNKNOWN - has not had   14. Do you have sleep apnea, excessive snoring or daytime drowsiness? No   15. Do you have any prosthetic heart valves? No   16. Do you have prosthetic joints? No   17. Is there any chance that you may be pregnant? No     Audit C Alcohol Screening Tool  AUDIT   Questions 0 1 2 3 4 Score   1. How often do you have a drink  containing alcohol? Never Monthly or less 2 to 4  times a  month 2 to 3  times a  week 4 or more  times a  week 2   2. How many drinks containing alcohol  do you have on a typical day when you are drinking? 1 or 2 3 or 4 5 or 6 7 to 9 10 or more 0   3. How often do you have more than five  or more drinks on one occasion? Never Less  than  monthly Monthly Weekly Daily or  almost  daily 1   Scoring:   A score of 4 for adult men or 3 for adult women is an indication of hazardous drinking (risk for physical or physiological harm).   A score of 5 or more for adult men or 4 or more for adult women is an indication of an alcohol use disorder.      Medical History   No past medical history on file.    No past surgical history on file.      Social History     Tobacco Use     Smoking status: Never Smoker     Smokeless tobacco: Never Used   Substance Use Topics     Alcohol  "use: Not on file     History   Drug Use Not on file       Current Outpatient Medications   Medication     ibuprofen (ADVIL) 200 MG capsule     No current facility-administered medications for this visit.        OTC products: None, except as noted above    No Known Allergies    Latex Allergy: NO      Review Of Systems   Constitutional, neuro, ENT, endocrine, pulmonary, cardiac, gastrointestinal, genitourinary, musculoskeletal (right knee pain), integument and psychiatric systems are negative, except as otherwise noted.    Exam     Patient Vitals for the past 24 hrs:   BP Temp Temp src Pulse Resp SpO2 Height Weight   01/20/20 1232 132/77 98  F (36.7  C) Oral 68 16 100 % 1.702 m (5' 7\") 65.8 kg (145 lb)     Body mass index is 22.71 kg/m .    GENERAL APPEARANCE: healthy, alert and no distress     EYES: EOMI, PERRL     HENT: ear canals and TM's normal and nose and mouth without ulcers or lesions     NECK: no adenopathy, no asymmetry, masses, or scars and thyroid normal to palpation     RESP: lungs clear to auscultation - no rales, rhonchi or wheezes     CV: regular rates and rhythm, normal S1 S2, no S3 or S4 and no murmur, click or rub     ABDOMEN:  soft, nontender, no HSM or masses and bowel sounds normal     MS: extremities normal- no gross deformities noted, no evidence of inflammation in joints, FROM in all extremities.     SKIN: no suspicious lesions or rashes     NEURO: Normal strength and tone, sensory exam grossly normal, mentation intact and speech normal     PSYCH: mentation appears normal. and affect normal/bright     LYMPHATICS: No cervical adenopathy    Diagnostics   No EKG required for healthy 29 year old woman.   She will have labs completed.       Answers for HPI/ROS submitted by the patient on 1/20/2020   If you checked off any problems, how difficult have these problems made it for you to do your work, take care of things at home, or get along with other people?: Very difficult  PHQ9 TOTAL SCORE: " 20  **I talked with Giana about her PHQ 9 score. She is seeking a Psychiatrist. She will return to see me if she needs assistance. She feels safe, this sadness has been ongoing.      Risk Assessment     Surgical Risk:  The proposed surgical procedure is considered LOW risk.    Revised Cardiac Risk Index  The patient has the following serious cardiovascular risks for perioperative complications such as (MI, PE, VFib and 3  AV Block):  No serious cardiac risks    INTERPRETATION: 0 risks: Class I (very low risk - 0.4% complication rate)    [facility] Activity Status Index  Total Points:  Total METs:    Functional capacity is classified as:  Excellent: >10 METs  Good: 7-10 METs   Moderate: 4 - 6 METs  Poor: <4 METs    The patient has the following additional risks for perioperative complications:  No identified additional risks    Component      Latest Ref Rng & Units 1/20/2020   Potassium      3.4 - 5.3 mmol/L 4.5   Hemoglobin      11.7 - 15.7 g/dL 14.2       Impression     For above listed surgery and anesthesia:   Patient is at low risk for surgery/procedure and perioperative/procedure complications.    Recommendations     --Patient is on no chronic medications      ICD-10-CM    1. Chronic pain of right knee M25.561     G89.29    2. Rupture of anterior cruciate ligament of right knee, subsequent encounter S83.511D    3. Other tear of medial meniscus of right knee as current injury, subsequent encounter S83.241D    4. Pre-op exam Z01.818 Hemoglobin     Potassium       Proceed with surgery as planned.    Patient is at low risk for surgery/procedure and perioperative/procedure complications.    A copy of this evaluation report will be provided to requesting physician.   Please contact our office if there are any further questions or information required about this patient.    [place] Preop Guidelines    ACC/AHA Guidelines  Omaira Looney, NEVIN, CNP

## 2020-01-31 PROBLEM — M25.561 ACUTE PAIN OF RIGHT KNEE: Status: RESOLVED | Noted: 2018-07-11 | Resolved: 2020-01-31

## 2020-01-31 PROBLEM — S83.511D NEW ACL TEAR, RIGHT, SUBSEQUENT ENCOUNTER: Status: RESOLVED | Noted: 2019-12-05 | Resolved: 2020-01-31

## 2020-02-03 ENCOUNTER — ANESTHESIA EVENT (OUTPATIENT)
Dept: SURGERY | Facility: AMBULATORY SURGERY CENTER | Age: 30
End: 2020-02-03

## 2020-02-03 PROBLEM — M25.561 ACUTE PAIN OF RIGHT KNEE: Status: ACTIVE | Noted: 2020-01-02

## 2020-02-03 NOTE — ANESTHESIA PREPROCEDURE EVALUATION
Anesthesia Pre-Procedure Evaluation    Patient: Yany Tolliver   MRN:     [id number] Gender:   female   Age:    29 year old :      1990        Preoperative Diagnosis: Acute pain of right knee [M25.561]   Procedure(s):  Examination under anesthesia Right knee, Right anterior cruciate ligament reconstruction bone tendon bone autograft  Right meniscus surgery     No past medical history on file.   No past surgical history on file.       Anesthesia Evaluation     .             ROS/MED HX    ENT/Pulmonary:  - neg pulmonary ROS     Neurologic:  - neg neurologic ROS     Cardiovascular:         METS/Exercise Tolerance:     Hematologic:  - neg hematologic  ROS       Musculoskeletal: Comment: Torn ACL and meniscus right leg        GI/Hepatic:  - neg GI/hepatic ROS       Renal/Genitourinary:  - ROS Renal section negative       Endo:  - neg endo ROS       Psychiatric:  - neg psychiatric ROS       Infectious Disease:  - neg infectious disease ROS       Malignancy:      - no malignancy   Other:                         PHYSICAL EXAM:   Mental Status/Neuro: A/A/O   Airway: Facies: Feasible  Mallampati: I  Mouth/Opening: Full  TM distance: > 6 cm  Neck ROM: Full   Respiratory: Auscultation: CTAB     Resp. Rate: Normal     Resp. Effort: Normal      CV: Rhythm: Regular  Rate: Age appropriate  Heart: Normal Sounds  Edema: None   Comments:      Dental: Normal Dentition                LABS:  CBC:   Lab Results   Component Value Date    HGB 14.2 2020     BMP:   Lab Results   Component Value Date    POTASSIUM 4.5 2020     COAGS: No results found for: PTT, INR, FIBR  POC: No results found for: BGM, HCG, HCGS  OTHER: No results found for: PH, LACT, A1C, ELMO, PHOS, MAG, ALBUMIN, PROTTOTAL, ALT, AST, GGT, ALKPHOS, BILITOTAL, BILIDIRECT, LIPASE, AMYLASE, YRN, TSH, T4, T3, CRP, SED     Preop Vitals    BP Readings from Last 3 Encounters:   20 132/77   19 128/68   18 122/78    Pulse Readings from  "Last 3 Encounters:   01/20/20 68   02/26/19 55   06/20/18 60      Resp Readings from Last 3 Encounters:   01/20/20 16   07/02/18 16    SpO2 Readings from Last 3 Encounters:   01/20/20 100%      Temp Readings from Last 1 Encounters:   01/20/20 36.7  C (98  F) (Oral)    Ht Readings from Last 1 Encounters:   01/20/20 1.702 m (5' 7\")      Wt Readings from Last 1 Encounters:   01/20/20 65.8 kg (145 lb)    Estimated body mass index is 22.71 kg/m  as calculated from the following:    Height as of 1/20/20: 1.702 m (5' 7\").    Weight as of 1/20/20: 65.8 kg (145 lb).     LDA:        Assessment:   ASA SCORE: 1    H&P: History and physical reviewed and following examination; no interval change.   Smoking Status:  Non-Smoker/Unknown   NPO Status: NPO Appropriate     Plan:   Anes. Type:  General; Peripheral Nerve Block     Block Details: Adductor C.; Exparel; Single Shot   Pre-Medication: Acetaminophen; Gabapentin   Induction:  IV (Standard)   Airway: LMA   Access/Monitoring: PIV   Maintenance: Propofol Sedation     Postop Plan:   Postop Pain: Regional  Postop Sedation/Airway: Not planned  Disposition: Outpatient     PONV Management:   Adult Risk Factors: Female, Non-Smoker   Prevention: Ondansetron, Dexamethasone, Propofol     CONSENT: Direct conversation   Plan and risks discussed with: Patient   Blood Products: Consent Deferred (Minimal Blood Loss)       Comments for Plan/Consent:  Discussed with Patient Off-Label use of Liposomal Bupivacaine (Exparel) for Nerve Block.    Relevant risks & benefits were discussed with patient.    All questions were answered and there was agreement to proceed.    Patient signed Off-Label Use of Exparel Consent Form.                   Dano Martin MD  "

## 2020-02-04 ENCOUNTER — ANESTHESIA (OUTPATIENT)
Dept: SURGERY | Facility: AMBULATORY SURGERY CENTER | Age: 30
End: 2020-02-04

## 2020-02-04 ENCOUNTER — HOSPITAL ENCOUNTER (OUTPATIENT)
Facility: AMBULATORY SURGERY CENTER | Age: 30
End: 2020-02-04
Attending: ORTHOPAEDIC SURGERY
Payer: COMMERCIAL

## 2020-02-04 VITALS
SYSTOLIC BLOOD PRESSURE: 110 MMHG | OXYGEN SATURATION: 100 % | DIASTOLIC BLOOD PRESSURE: 67 MMHG | RESPIRATION RATE: 14 BRPM | HEIGHT: 67 IN | HEART RATE: 57 BPM | TEMPERATURE: 97.8 F | WEIGHT: 145 LBS | BODY MASS INDEX: 22.76 KG/M2

## 2020-02-04 DIAGNOSIS — M25.561 ACUTE PAIN OF RIGHT KNEE: ICD-10-CM

## 2020-02-04 DIAGNOSIS — T14.8XXA LIGAMENT TEAR: Primary | ICD-10-CM

## 2020-02-04 LAB
HCG UR QL: NEGATIVE
INTERNAL QC OK POCT: YES

## 2020-02-04 DEVICE — IMP SCR ARTHREX CAN FULL THRD 08X25MM AR-1381T: Type: IMPLANTABLE DEVICE | Site: KNEE | Status: FUNCTIONAL

## 2020-02-04 DEVICE — IMP SCR ARTHREX CAN 07X20MM AR-1370E: Type: IMPLANTABLE DEVICE | Site: KNEE | Status: FUNCTIONAL

## 2020-02-04 RX ORDER — BUPIVACAINE HYDROCHLORIDE 2.5 MG/ML
INJECTION, SOLUTION EPIDURAL; INFILTRATION; INTRACAUDAL PRN
Status: DISCONTINUED | OUTPATIENT
Start: 2020-02-04 | End: 2020-02-04

## 2020-02-04 RX ORDER — SODIUM CHLORIDE, SODIUM LACTATE, POTASSIUM CHLORIDE, CALCIUM CHLORIDE 600; 310; 30; 20 MG/100ML; MG/100ML; MG/100ML; MG/100ML
INJECTION, SOLUTION INTRAVENOUS CONTINUOUS
Status: DISCONTINUED | OUTPATIENT
Start: 2020-02-04 | End: 2020-02-05 | Stop reason: HOSPADM

## 2020-02-04 RX ORDER — FENTANYL CITRATE 50 UG/ML
INJECTION, SOLUTION INTRAMUSCULAR; INTRAVENOUS PRN
Status: DISCONTINUED | OUTPATIENT
Start: 2020-02-04 | End: 2020-02-04

## 2020-02-04 RX ORDER — ONDANSETRON 2 MG/ML
INJECTION INTRAMUSCULAR; INTRAVENOUS PRN
Status: DISCONTINUED | OUTPATIENT
Start: 2020-02-04 | End: 2020-02-04

## 2020-02-04 RX ORDER — OXYCODONE HYDROCHLORIDE 5 MG/1
5 TABLET ORAL EVERY 4 HOURS PRN
Status: DISCONTINUED | OUTPATIENT
Start: 2020-02-04 | End: 2020-02-05 | Stop reason: HOSPADM

## 2020-02-04 RX ORDER — ONDANSETRON 4 MG/1
4 TABLET, ORALLY DISINTEGRATING ORAL
Status: DISCONTINUED | OUTPATIENT
Start: 2020-02-04 | End: 2020-02-05 | Stop reason: HOSPADM

## 2020-02-04 RX ORDER — HYDROXYZINE HYDROCHLORIDE 25 MG/1
25 TABLET, FILM COATED ORAL
Status: DISCONTINUED | OUTPATIENT
Start: 2020-02-04 | End: 2020-02-05 | Stop reason: HOSPADM

## 2020-02-04 RX ORDER — FENTANYL CITRATE 50 UG/ML
25-50 INJECTION, SOLUTION INTRAMUSCULAR; INTRAVENOUS EVERY 5 MIN PRN
Status: DISCONTINUED | OUTPATIENT
Start: 2020-02-04 | End: 2020-02-04 | Stop reason: HOSPADM

## 2020-02-04 RX ORDER — DEXAMETHASONE SODIUM PHOSPHATE 4 MG/ML
INJECTION, SOLUTION INTRA-ARTICULAR; INTRALESIONAL; INTRAMUSCULAR; INTRAVENOUS; SOFT TISSUE PRN
Status: DISCONTINUED | OUTPATIENT
Start: 2020-02-04 | End: 2020-02-04

## 2020-02-04 RX ORDER — NALOXONE HYDROCHLORIDE 0.4 MG/ML
.1-.4 INJECTION, SOLUTION INTRAMUSCULAR; INTRAVENOUS; SUBCUTANEOUS
Status: DISCONTINUED | OUTPATIENT
Start: 2020-02-04 | End: 2020-02-05 | Stop reason: HOSPADM

## 2020-02-04 RX ORDER — NALOXONE HYDROCHLORIDE 0.4 MG/ML
.1-.4 INJECTION, SOLUTION INTRAMUSCULAR; INTRAVENOUS; SUBCUTANEOUS
Status: DISCONTINUED | OUTPATIENT
Start: 2020-02-04 | End: 2020-02-04 | Stop reason: HOSPADM

## 2020-02-04 RX ORDER — CEFAZOLIN SODIUM 1 G/50ML
1 SOLUTION INTRAVENOUS SEE ADMIN INSTRUCTIONS
Status: DISCONTINUED | OUTPATIENT
Start: 2020-02-04 | End: 2020-02-04 | Stop reason: HOSPADM

## 2020-02-04 RX ORDER — BUPIVACAINE HYDROCHLORIDE AND EPINEPHRINE 2.5; 5 MG/ML; UG/ML
INJECTION, SOLUTION INFILTRATION; PERINEURAL PRN
Status: DISCONTINUED | OUTPATIENT
Start: 2020-02-04 | End: 2020-02-04 | Stop reason: HOSPADM

## 2020-02-04 RX ORDER — CEFAZOLIN SODIUM 2 G/50ML
2 SOLUTION INTRAVENOUS
Status: COMPLETED | OUTPATIENT
Start: 2020-02-04 | End: 2020-02-04

## 2020-02-04 RX ORDER — OXYCODONE HYDROCHLORIDE 5 MG/1
5 TABLET ORAL
Status: COMPLETED | OUTPATIENT
Start: 2020-02-04 | End: 2020-02-04

## 2020-02-04 RX ORDER — PROPOFOL 10 MG/ML
INJECTION, EMULSION INTRAVENOUS CONTINUOUS PRN
Status: DISCONTINUED | OUTPATIENT
Start: 2020-02-04 | End: 2020-02-04

## 2020-02-04 RX ORDER — KETOROLAC TROMETHAMINE 30 MG/ML
INJECTION, SOLUTION INTRAMUSCULAR; INTRAVENOUS PRN
Status: DISCONTINUED | OUTPATIENT
Start: 2020-02-04 | End: 2020-02-04

## 2020-02-04 RX ORDER — SODIUM CHLORIDE, SODIUM LACTATE, POTASSIUM CHLORIDE, CALCIUM CHLORIDE 600; 310; 30; 20 MG/100ML; MG/100ML; MG/100ML; MG/100ML
INJECTION, SOLUTION INTRAVENOUS CONTINUOUS
Status: DISCONTINUED | OUTPATIENT
Start: 2020-02-04 | End: 2020-02-04 | Stop reason: HOSPADM

## 2020-02-04 RX ORDER — ACETAMINOPHEN 325 MG/1
975 TABLET ORAL ONCE
Status: COMPLETED | OUTPATIENT
Start: 2020-02-04 | End: 2020-02-04

## 2020-02-04 RX ORDER — MEPERIDINE HYDROCHLORIDE 25 MG/ML
12.5 INJECTION INTRAMUSCULAR; INTRAVENOUS; SUBCUTANEOUS
Status: DISCONTINUED | OUTPATIENT
Start: 2020-02-04 | End: 2020-02-05 | Stop reason: HOSPADM

## 2020-02-04 RX ORDER — ONDANSETRON 4 MG/1
4 TABLET, ORALLY DISINTEGRATING ORAL EVERY 30 MIN PRN
Status: DISCONTINUED | OUTPATIENT
Start: 2020-02-04 | End: 2020-02-05 | Stop reason: HOSPADM

## 2020-02-04 RX ORDER — ONDANSETRON 2 MG/ML
4 INJECTION INTRAMUSCULAR; INTRAVENOUS EVERY 30 MIN PRN
Status: DISCONTINUED | OUTPATIENT
Start: 2020-02-04 | End: 2020-02-05 | Stop reason: HOSPADM

## 2020-02-04 RX ORDER — LIDOCAINE 40 MG/G
CREAM TOPICAL
Status: DISCONTINUED | OUTPATIENT
Start: 2020-02-04 | End: 2020-02-04 | Stop reason: HOSPADM

## 2020-02-04 RX ORDER — FLUMAZENIL 0.1 MG/ML
0.2 INJECTION, SOLUTION INTRAVENOUS
Status: DISCONTINUED | OUTPATIENT
Start: 2020-02-04 | End: 2020-02-04 | Stop reason: HOSPADM

## 2020-02-04 RX ORDER — FENTANYL CITRATE 50 UG/ML
25-50 INJECTION, SOLUTION INTRAMUSCULAR; INTRAVENOUS
Status: DISCONTINUED | OUTPATIENT
Start: 2020-02-04 | End: 2020-02-04 | Stop reason: HOSPADM

## 2020-02-04 RX ORDER — OXYCODONE HYDROCHLORIDE 5 MG/1
5-10 TABLET ORAL EVERY 4 HOURS PRN
Qty: 30 TABLET | Refills: 0 | Status: SHIPPED | OUTPATIENT
Start: 2020-02-04 | End: 2020-02-08 | Stop reason: ALTCHOICE

## 2020-02-04 RX ORDER — PROPOFOL 10 MG/ML
INJECTION, EMULSION INTRAVENOUS PRN
Status: DISCONTINUED | OUTPATIENT
Start: 2020-02-04 | End: 2020-02-04

## 2020-02-04 RX ADMIN — KETOROLAC TROMETHAMINE 30 MG: 30 INJECTION, SOLUTION INTRAMUSCULAR; INTRAVENOUS at 09:58

## 2020-02-04 RX ADMIN — OXYCODONE HYDROCHLORIDE 5 MG: 5 TABLET ORAL at 10:30

## 2020-02-04 RX ADMIN — ONDANSETRON 4 MG: 2 INJECTION INTRAMUSCULAR; INTRAVENOUS at 08:10

## 2020-02-04 RX ADMIN — FENTANYL CITRATE 50 MCG: 50 INJECTION, SOLUTION INTRAMUSCULAR; INTRAVENOUS at 07:45

## 2020-02-04 RX ADMIN — PROPOFOL 200 MG: 10 INJECTION, EMULSION INTRAVENOUS at 08:10

## 2020-02-04 RX ADMIN — BUPIVACAINE HYDROCHLORIDE 10 ML: 2.5 INJECTION, SOLUTION EPIDURAL; INFILTRATION; INTRACAUDAL at 07:47

## 2020-02-04 RX ADMIN — ACETAMINOPHEN 975 MG: 325 TABLET ORAL at 07:21

## 2020-02-04 RX ADMIN — DEXAMETHASONE SODIUM PHOSPHATE 4 MG: 4 INJECTION, SOLUTION INTRA-ARTICULAR; INTRALESIONAL; INTRAMUSCULAR; INTRAVENOUS; SOFT TISSUE at 08:10

## 2020-02-04 RX ADMIN — PROPOFOL 150 MCG/KG/MIN: 10 INJECTION, EMULSION INTRAVENOUS at 08:10

## 2020-02-04 RX ADMIN — FENTANYL CITRATE 25 MCG: 50 INJECTION, SOLUTION INTRAMUSCULAR; INTRAVENOUS at 10:33

## 2020-02-04 RX ADMIN — FENTANYL CITRATE 25 MCG: 50 INJECTION, SOLUTION INTRAMUSCULAR; INTRAVENOUS at 09:25

## 2020-02-04 RX ADMIN — FENTANYL CITRATE 25 MCG: 50 INJECTION, SOLUTION INTRAMUSCULAR; INTRAVENOUS at 10:28

## 2020-02-04 RX ADMIN — CEFAZOLIN SODIUM 2 G: 2 SOLUTION INTRAVENOUS at 08:15

## 2020-02-04 RX ADMIN — FENTANYL CITRATE 25 MCG: 50 INJECTION, SOLUTION INTRAMUSCULAR; INTRAVENOUS at 09:45

## 2020-02-04 RX ADMIN — OXYCODONE HYDROCHLORIDE 5 MG: 5 TABLET ORAL at 11:06

## 2020-02-04 RX ADMIN — SODIUM CHLORIDE, SODIUM LACTATE, POTASSIUM CHLORIDE, CALCIUM CHLORIDE: 600; 310; 30; 20 INJECTION, SOLUTION INTRAVENOUS at 07:21

## 2020-02-04 ASSESSMENT — MIFFLIN-ST. JEOR: SCORE: 1425.35

## 2020-02-04 NOTE — ANESTHESIA CARE TRANSFER NOTE
Patient: Yany Tolliver    Procedure(s):  Examination under anesthesia Right knee, Right anterior cruciate ligament reconstruction bone tendon bone autograft  Right knee lateral meniscal repair    Diagnosis: Acute pain of right knee [M25.561]  Diagnosis Additional Information: No value filed.    Anesthesia Type:   MAC, Peripheral Nerve Block     Note:  Airway :Nasal Cannula  Patient transferred to:PACU  Comments: Uneventful transport with NC 2 lpm; off upon arrival to PACU  Report to GLENNY James  Exchanging well; color natl  Pt responds appropriately to command  IV patent  Lips/teeth/dentition as preop status  Pt stated she is having pain  Questions answered  /83  HR 58 sb  TAT 97.68375  RR 16  Sat % on room air  Handoff Report: Identifed the Patient, Identified the Reponsible Provider, Reviewed the pertinent medical history, Discussed the surgical course, Reviewed Intra-OP anesthesia mangement and issues during anesthesia, Set expectations for post-procedure period and Allowed opportunity for questions and acknowledgement of understanding      Vitals: (Last set prior to Anesthesia Care Transfer)    CRNA VITALS  2/4/2020 0949 - 2/4/2020 1025      2/4/2020             Pulse:  63    SpO2:  100 %    Resp Rate (observed):  (!) 3    Resp Rate (set):  10                Electronically Signed By: NEVIN BAJWA CRNA  February 4, 2020  10:25 AM

## 2020-02-04 NOTE — OP NOTE
PREOPERATIVE DIAGNOSIS:   1. ACL tear Right knee  2. Radial tear of lateral meniscus  3. Possible medial meniscal capsular seperation    POSTOPERATIVE DIAGNOSIS:  1. ACL tear right knee, grade 3  2. Radial tear of the posterior horn of the lateral meniscus, oblique  3. Radial mid body tear of the lateral meniscus  4. Intact medial meniscal capsular junction with no instability medial meniscus    PROCEDURE:  1. Examination under anesthesia right knee  2. Right knee arthroscopy  3. ACL reconstruction bone tendon bone autograft  4. All inside suture based repair of radial oblique tear of lateral meniscus  5. Partial meniscectomy of the mid body of the lateral meniscus    DATE OF SURGERY: 2/4/2020    SURGEON: Stefan Bhat MD    ASSISTANT: None.     RESIDENT OR FELLOW: Rios Rios MD    OPERATIVE INDICATIONS: Yany Tolliver is a pleasant 29 year old female who I saw through my orthopedic clinic with a history, physical, imaging consistent with right ACL tear, lateral meniscus tear.  I reviewed with the patient the risks, benefits, complications, techniques and alternatives to surgery.  We reviewed the expected course of recovery and the potential expected outcomes.  The patient understood both the risks and benefits and desired to proceed despite the risks.    OPERATIVE DETAILS: In the preoperative area the patient's informed consent was reviewed and they desired to proceed.  The right leg was marked and the patient was in agreement.  The patient was taken to the operating room where a timeout was performed and all parties were in agreement.  Preoperative antibiotics were given within 1 hour of the time of incision.  The patient was placed in the supine position and surrendered to LMA anesthesia.  No tourniquet was applied.  Egg crate was placed beneath the well leg and a side post was utilized.  The operative leg was prepped and draped in the usual sterile fashion.     Examination under anesthesia: Range  "of motion 0 to 135 degrees, 1 quadrant medial and 2 quadrant lateral translation of the patella, stable to varus and valgus stress testing, stable posterior drawer testing, 2+ anterior drawer testing, 2B Lachman, 1+ pivot shift    Graft harvest and preparation: A 5 cm midline incision was made and hemostasis was insured with the Bovie electrocautery.  The peritenon was opened longitudinally.  And we selected a section of tendon 10 mm in width.  We then marked on the tibial side 10 x 25 mm.  This was marked with a knife, defined with a Bovie and cut with an oscillating saw it was delivered into the wound with an osteotome.  The knee was brought to full extension where a proximal patellar retractor was placed.  We selected a section of bone 10 mm in width by 20 mm in length it was marked with a knife to find with the Bovie and cut with an oscillating saw.  No malleting was performed of the patella.    The graft was taken to the back table where it was fashioned to a 10 on the femur size 10 on the tibia.  2 drill holes were placed in each of the bone blocks and #2 fiber wires were placed through these holes.  A \"safety stitch\" was placed at the bone tendon interface on the tibial side.  Ink marking pen was used to leona the bone tendon interface in the femoral side.  The final graft dimensions showed a 20 mm bone block on the femoral side, a 25 mm bone block on the tibial side and the tibial plus tendon length of 75 mm.    Anterior medial and anterior lateral arthroscopic portals were created and a diagnostic arthroscopy was performed with the following findings: The medial patella facet, lateral patella facet, central ridge of the patella showed normal cartilage.  The trochlear cartilage was normal.  The medial femoral condyle showed normal cartilage and medial tibial plateau showed normal cartilage. The lateral femoral condyle showed normal cartilage and lateral tibial plateau showed normal. The Medial meniscus was " intact and stable to probing no tears when viewed through the Gillquist portal and Lateral Meniscus showed a radial oblique tear of the posterior horn of the lateral meniscus as well as a 2 to 3 mm mid body radial tear.  There was a grade 3 rupture of the anterior cruciate ligament with positive empty wall sign.  The PCL was intact.  There was no opening to varus and valgus stress testing in either the lateral or medial compartments, respectively.    A debridement of the nonfunctioning ACL was then performed until we could visualize the anatomic insertion sites of the ACL on both the femoral and the tibial origin.  Remnant preservation was pursued in the tibial side and the tibial tunnel was centered midway between the medial and lateral tibial spines in line with the posterior aspect of the anterior horn of the lateral meniscus.    A tip to tip guide was introduced through the medial portal.  Our osseous length measured 50 mm to accommodate the tibial plus tendon length of our graft.  A 2.4 mm drill to pin was placed into the center of the anatomic insertion of the ACL on the tibial side.  A 10 mm reamer was then placed over this guidepin.  We dilated sequentially from 10-10.5 mm.  A plug was placed on the tibial side.    A spinal needle was then used to localize our accessory medial portal.  Once we are satisfied with its position a Rusty awl was used to select our location along the anatomic insertion of the ACL on the femoral footprint.  A Beath pin was placed.  The knee was hyperflexed.  The pin was advanced through the femur and a 10 mm low-profile reamer was used to ream a 25 mm femoral socket.  Bone debris was removed with motorized suction.  A passing suture was placed which was routed through the tibia.  Notching of the femoral tunnel was then performed.    At this time a passport cannula was placed in the lateral portal and a series of 3, 0 FiberWire sutures were placed with the meniscus scorpion  arthroscopic knot-tying was completed providing excellent compression across the radial oblique tear of the posterior horn lateral meniscus.  We then performed a partial meniscectomy of the mid body with a biter from the anterior medial and accessory anteromedial portal until about stable rim of meniscal tissue remained    The graft was brought up the patellar donor bone block was reduced through the tibial tunnel and dunked into the femur where it was fixed with a 7 x 20 mm metal interference screw.  Excellent purchase of the screw is noted.  The graft was cycled 20 times, maximal manual traction was applied and an 8 x 25 mm screw was placed in the tibial side with excellent purchase.  Lachman 0, no pivot shift, final arthroscopic images showed clearance along the root of the intercondylar notch and extension, clearance along the lateral wall and PCL in flexion.  Good tension to probing.    Copious irrigation was performed an a layered closure was initiated, sterile dressings were applied and the patient was transferred to the recovery room in stable condition with stable vital signs.    ESTIMATED BLOOD LOSS: 25 mL.    TOURNIQUET TIME: No tourniquet was placed.    COMPLICATIONS: None apparent.    DRAINS: None.    SPECIMENS: None.     POSTOPERATIVE PLAN:  Toe-touch weightbearing right lower extremity x4 weeks  Hinged knee brace x6 weeks  On and locked when up and around, off for unlocked for sitting or therapy for the first 4 weeks at 4 weeks begin weightbearing as tolerated in extension at 6 weeks begin weightbearing as tolerated with brace unlocked and then wean when comfortable  The goal is to walk into my clinic at 6 weeks with no brace and no crutches  No running until 3 months  No sports until 6 months, return to game competition at 7-10 months  Shower on day 3  Start physical therapy day 3-5

## 2020-02-04 NOTE — ANESTHESIA PROCEDURE NOTES
Peripheral Nerve Block Procedure Note    Staff:     Anesthesiologist:  Yasir Rodgers MD    Resident/CRNA:  Dano Martin MD    Block performed by resident/CRNA in the presence of a teaching physician    Location: Pre-op  Procedure Start/Stop TImes:      2/4/2020 7:40 AM     2/4/2020 7:48 AM    patient identified, IV checked, site marked, risks and benefits discussed, informed consent, monitors and equipment checked, pre-op evaluation, at physician/surgeon's request and post-op pain management      Correct Patient: Yes      Correct Position: Yes      Correct Site: Yes      Correct Procedure: Yes      Correct Laterality:  Yes    Site Marked:  Yes  Procedure details:     Procedure:  Adductor canal    ASA:  1    Diagnosis:  Post operative pain    Laterality:  Right    Position:  Supine    Sterile Prep: chloraprep      Local skin infiltration:  None    Needle:  Insulated    Needle gauge:  21    Needle length (inches):  4    Ultrasound: Yes      Ultrasound used to identify targeted nerve, plexus, or vascular structure and placed a needle adjacent to it      Permanent Image entered into patiient's record      Abnormal pain on injection: No      Blood Aspirated: No      Paresthesias:  No    Bleeding at site: No      Bolus via:  Needle    Infusion Method:  Single Shot    Complications:  None  Assessment/Narrative:     Injection made incrementally with aspirations every (mL):  5     Block performed with 10 ml (133 mg) exparel.

## 2020-02-04 NOTE — ANESTHESIA POSTPROCEDURE EVALUATION
Anesthesia POST Procedure Evaluation    Patient: Yany Tolliver   MRN:     [id number] Gender:   female   Age:    29 year old :      1990        Preoperative Diagnosis: Acute pain of right knee [M25.561]   Procedure(s):  Examination under anesthesia Right knee, Right anterior cruciate ligament reconstruction bone tendon bone autograft  Right knee lateral meniscal repair   Postop Comments: No value filed.       Anesthesia Type:  Not documented  MAC, Peripheral Nerve Block    Reportable Event: NO     PAIN: Uncomplicated   Sign Out status: Comfortable, Well controlled pain     PONV: No PONV   Sign Out status:  No Nausea or Vomiting     Neuro/Psych: Uneventful perioperative course   Sign Out Status: Preoperative baseline; Age appropriate mentation     Airway/Resp.: Uneventful perioperative course   Sign Out Status: Non labored breathing, age appropriate RR; Resp. Status within EXPECTED Parameters     CV: Uneventful perioperative course   Sign Out status: Appropriate BP and perfusion indices; Appropriate HR/Rhythm     Disposition:   Sign Out in:  PACU  Disposition:  Phase II; Home  Recovery Course: Uneventful  Follow-Up: Not required           Last Anesthesia Record Vitals:  CRNA VITALS  2020 0949 - 2020 1049      2020             Pulse:  63    SpO2:  100 %    Resp Rate (observed):  (!) 3    Resp Rate (set):  10          Last PACU Vitals:  Vitals Value Taken Time   /79 2020 10:37 AM   Temp 36.4  C (97.6  F) 2020 10:37 AM   Pulse 66 2020 10:37 AM   Resp 16 2020 10:38 AM   SpO2 97 % 2020 10:38 AM   Temp src     NIBP     Pulse     SpO2     Resp     Temp     Ht Rate     Temp 2     Vitals shown include unvalidated device data.      Electronically Signed By: Yasir Rodgers MD, 2020, 11:53 AM

## 2020-02-04 NOTE — DISCHARGE INSTRUCTIONS
"[facility]  Home Care Following Anesthesia  For 24 hours after surgery:  1. Get plenty of rest.  A responsible adult must stay with you for at least 24 hours after you leave the surgery center.  2. Do not drive or use heavy equipment.  If you have weakness or tingling, don't drive or use heavy equipment until this feeling goes away.   3. Do not drink alcohol.   4. Avoid strenuous or risky activities.  Ask for help when climbing stairs.  5. You may feel lightheaded.  IF so, sit for a few minutes before standing.  Have someone help you get up.   6. If you have nausea (feel sick to your stomach): Drink only clear liquids such as apple juice, ginger ale, broth or 7-Up.  Rest may also help.  Be sure to drink enough fluids.  Move to a regular diet as you feel able.   7. You may have a slight fever.  Call the doctor if your fever is over 100 F (37.7 C) (taken under the tongue) or lasts longer than 24 hours.  8. You may have a dry mouth, a sore throat, muscle aches or trouble sleeping. These should go away after 24 hours.  9. Do not make important or legal decisions.        Today you received an Exparel block to numb the nerves near your surgery site.  This is a block using local anesthetic or \"numbing\" medication injected around the nerves to anesthetize or \"numb\" the area supplied by those nerves.  This block is injected into the muscle layer near your surgical site.  This medication may numb the location where you had surgery up to 72 hours.  If your surgical site is an arm or leg you should be careful with your affected limb, since it is possible to injure your limb without being aware of it due to the numbing.  Until full feeling returns, you should guard against bumping or hitting your limb, and avoid extreme hot or cold temperatures on the skin.  As the block wears off, the feeling will return as a tingling or prickly sensation near your surgical site.  You will experince more " discomfort from your incision as the feeling returns.  You may want to take a pain pill (a narcotic or Tylenol if this was prescribed by your surgeon) when you start to experience mild pain before the pain beomes more severe.  If your pain medications do not control your pain, you should notify your surgeon.    Tips for taking pain medications  To get the best pain relief possible, remember these points:    Take pain medications as directed, before pain becomes severe.    Pain medication can upset your stomach: taking it with food may help.    Constipation is a common side effect of pain medication. Drink plenty of  fluids.    Eat foods high in fiber. Take a stool softener if recommended by your doctor or pharmacist.    Do not drink alcohol, drive or operate machinery while taking pain medications.    Ask about other ways to control pain, such as with heat, ice or relaxation.    Tylenol/Acetaminophen Consumption  To help encourage the safe use of acetaminophen, the makers of TYLENOL  have lowered the maximum daily dose for single-ingredient Extra Strength TYLENOL  (acetaminophen) products sold in the [place] from 8 pills per day (4,000 mg) to 6 pills per day (3,000 mg). The dosing interval has also changed from 2 pills every 4-6 hours to 2 pills every 6 hours.    If you feel your pain relief is insufficient, you may take Tylenol/Acetaminophen in addition to your narcotic pain medication.     Be careful not to exceed 3,000 mg of Tylenol/Acetaminophen in a 24 hour period from all sources.    If you are taking extra strength Tylenol/acetaminophen (500 mg), the maximum dose is 6 tablets in 24 hours.    If you are taking regular strength acetaminophen (325 mg), the maximum dose is 9 tablets in 24 hours.    Call a doctor for any of the followin. Signs of infection (fever, growing tenderness at the surgery site, a large amount of drainage or bleeding, severe pain, foul-smelling drainage, redness, swelling).  2. It has  "been over 8 to 10 hours since surgery and you are still not able to urinate (pass water).  3. Headache for over 24 hours.  Your doctor is:       Dr. Stefan Bhat, Orthopaedics: [phone]               Or dial [phone] and ask for the resident on call for:  Orthopaedics  For emergency care, call the:  [facility] Emergency Department: [phone] (TTY for hearing impaired: [phone])    Information about liposomal bupivacaine (Exparel)    What is Liposomal Bupivacaine?    Liposomal Bupivacaine is a numbing medication that can help you manage your pain after surgery.  This medication is similar to \"novacaine,\" which is often used by the dentist.  Liposomal bupivacaine is released slowly and can help control pain for up to 72 hours.    What is the purpose of Liposomal Bupivacaine?    To manage your pain after surgery    To help you sleep better, take deep breaths, walk more comfortable, and feel up to visiting with others    How is the procedure done?    Liposomal bupivacaine is a medication given by an injection.    It is usually given right before your surgery.  If this is the case, you will be awake or sedated, but you should experience minimal pain during the procedure.    For some people, the injection may be given at the very end of your surgery.  It all depends on the type of surgery and your situation.    The procedure usually takes about 5-15 minutes.  An ultrasound machine will help the anesthesiologist insert it in the right place or the surgeon will inject it under direct vision.     A needle is used to place the numbing medication under your skin.  It provides pain relief by numbing the tissue in the area where your surgeon will make the incision.    What can I expect?    You may experience numbness, tingling, or a feeling of heaviness around the area that was injected.    If you experience any of the follow symptoms IMMEDIATELY CALL THE REGIONAL ANESTHESIA PAIN SERVICE:    Numbness or " tingling occurs in areas other than around the injection site    Blurry vision    Ringing in your ears    A metallic taste in your mouth    PAGE: Dial [phone].  When prompted, enter the following 4-digit ID number:  [id number].  You will be prompted to enter your phone number; and then enter the # sign.  The clinician on call will call you back.    OR    CALL: Dial [phone].  Let the hospital  know that you are having a problem with a nerve block and that you would like to speak to the regional anesthesia pain service right away.    You should not receive any other type of numbing medication within 4 days after receiving liposomal bupivacaine unless your anesthesiologist approves.    Post Operative Instructions: Regional Anesthetic for Lower Extremity with Liposomal Bupivacaine  General Information:   Regional anesthesia is when local anesthetic or  numbing  medication is injected around the nerves to anesthetize or  numb  the area supplied by that set of nerves. It is a type of analgesia used to control pain and decreases the need for narcotics following surgery.    Types of Regional Blocks:  Femoral: A block injected into the groin area of the operative leg of a patient having thigh or knee surgery.  Adductor Canal: A block injected into the mid thigh of the operative leg of a patient having knee or ankle surgery.  Popliteal or Distal Sciatic: A block injected into the back of the knee of the operative leg of patients having foot or ankle surgery.   Ankle:  An anesthetic medication is injected into the ankle of the operative leg of a patient having foot or toe surgery.     Procedure:   The type of anesthesia your doctor used to numb your leg will usually not wear off for 24-48 hours, but may last as long as 72 hours. You should be careful during that period, since it is possible to injure your leg without being aware of the injury. While your leg is numb you should:    Use crutches (minimal weight  "bearing until your motor and strength is completely back to normal)    Avoid striking or bumping your leg    Avoid extreme hot or cold    Discomfort:  You will have a tingling and prickly sensation in your leg as the feeling begins to return; you can also expect some discomfort. The amount of discomfort is unpredictable, but if you have more pain than can be controlled with pain medication, you should notify your physician.     Pain Medicine:   Begin taking your oral pain pills before bedtime and during the night to avoid a sudden onset of pain as part of the block wears off. Do not engage in drinking, driving, or hazardous occupations while taking pain medication.     Safety Tips for Using Crutches    Crutch Fit:    Assume good standing posture with shoulders relaxed and crutch tips 6-8 inches out from the side of the foot.    The underarm pad should fall 2-3 fingers width below the armpit.    The handgrip is positioned level with the wrist to allow 30  flexion at the elbow.    Safety Tips:    Bear weight on your hands, not on your armpits.    Do not add extra padding to the underarm pad. This will, in effect, lengthen the crutches and increase risk of nerve injury.    Wear flat, properly fitting shoes. Do not walk in stocking feet, high heels or slippers.    Household hazards:  --Throw rugs should be removed from floors.  --Stairs should be cleared of obstacles.  --Use extra caution on slippery, highly polished, littered or uneven floor surfaces.  --Check for electric cords.    Check crutch tips for excessive wear and keep wing nuts tight.    While walking, look forward with  head up  and  eyes open.  Take equal length steps.    Use BOTH crutches.    Stairs Sequence:    UP: \"Good\" leg first, followed by  bad  leg, then crutches.    DOWN: Crutches, followed by  bad  leg, \"good\" leg.     Walking with Crutches:    Move both crutches forward at the same time.    Non-Weight Bearing (NWB):  Hold the involved leg up and " swing through the crutches with the involved leg. The involved leg does not touch the floor.    Toe Touch Weight Bearing (TTWB): Move the involved leg forward. Rest it lightly on the floor for balance only. Step through the crutches with the uninvolved leg.    Partial Weight Bearing (PWB): Move the involved leg forward. Step down the weight of the leg only.  Step through the crutches with the uninvolved leg.  Weight Bearing As Tolerated (WBAT): Move the involved leg forward. Put as much pressure through the involved leg as you can tolerate comfortably. Then step through the crutches with the uninvolved leg.

## 2020-02-05 ENCOUNTER — PATIENT OUTREACH (OUTPATIENT)
Dept: ORTHOPEDICS | Facility: CLINIC | Age: 30
End: 2020-02-05

## 2020-02-05 DIAGNOSIS — Z98.890 STATUS POST RIGHT KNEE SURGERY: Primary | ICD-10-CM

## 2020-02-05 NOTE — TELEPHONE ENCOUNTER
Returned patient's call. Discussed her current pain regimen. She states this morning when she got up, her pain was an 8 out of 10. She is taking 1 tablet of oxycodone every 4 hours and alternating Tylenol in between. Instructed to ice 20 minutes every hour and rest and elevate. Did encourage patient that it is also important to get up to the bathroom and move around from time to time as able. We did discuss that day 2 and 3 are the toughest days postop and she may need to occasionally take 2 tablets rather than 1 when her pain is severe. Patient understands she may call for a refill before the weekend if she needs one. She has her PT set up for Friday and will see Dr. Bhat on 2/12 Wednesday for 1 week followup. Patient will call tomorrow to update this writer about her pain management. Patient expresses  Understanding.

## 2020-02-05 NOTE — PROGRESS NOTES
Patient reached out to discuss pain management; performed postop call at the same time. See telephone encounter for details. She has both PT and 1 week follow up scheduled.

## 2020-02-05 NOTE — TELEPHONE ENCOUNTER
[facility] Call Center    Phone Message    May a detailed message be left on voicemail: no     Reason for Call: Other: Pt reports after surgery yesterday that her pain meds are now not working at all to help with her pain, which is at a 6/10. She also reports the brace feeling too tight and her knee is throbbing. Pt is having a hard time with any mobility at all. Please call Pt back to discuss.     Action Taken: Message routed to:  [facility]    Travel Screening: Not Applicable

## 2020-02-07 ENCOUNTER — TELEPHONE (OUTPATIENT)
Dept: ORTHOPEDICS | Facility: CLINIC | Age: 30
End: 2020-02-07

## 2020-02-07 RX ORDER — HYDROCODONE BITARTRATE AND ACETAMINOPHEN 5; 325 MG/1; MG/1
TABLET ORAL
Qty: 30 TABLET | Refills: 0 | Status: SHIPPED | OUTPATIENT
Start: 2020-02-07 | End: 2022-04-21

## 2020-02-07 NOTE — TELEPHONE ENCOUNTER
[facility] Call Center    Phone Message    May a detailed message be left on voicemail: yes     Reason for Call: Other: Lamin is requesting the surgery note please be faxed to [phone].     Action Taken: Message routed to:  [facility]): Ortho    Travel Screening: Not Applicable

## 2020-02-07 NOTE — TELEPHONE ENCOUNTER
[facility] Call Center    Phone Message    May a detailed message be left on voicemail: yes     Reason for Call: Other: Pt is requesting a call back from Megan to discuss changing medication and/or getting medication before the weekend.     Action Taken: Message routed to:  [facility]): Ortho    Travel Screening: Not Applicable

## 2020-02-07 NOTE — TELEPHONE ENCOUNTER
Patient wishes to change her medication as the oxycodone is making her nauseous and giving her migraines. We discussed switching to Norco. She is willing to try this. Taking 2 tablets every 4 hours up until today; is weaning to 1 tablet every 4 hours. Prescription sent electronically to patient's pharmacy.

## 2020-02-08 ENCOUNTER — NURSE TRIAGE (OUTPATIENT)
Dept: NURSING | Facility: CLINIC | Age: 30
End: 2020-02-08

## 2020-02-08 ENCOUNTER — TELEPHONE (OUTPATIENT)
Dept: ORTHOPEDICS | Facility: CLINIC | Age: 30
End: 2020-02-08

## 2020-02-08 DIAGNOSIS — Z98.890 STATUS POST RIGHT KNEE SURGERY: Primary | ICD-10-CM

## 2020-02-09 NOTE — TELEPHONE ENCOUNTER
Received notification from Triage line that Ms. Tolliver was having difficulty with filling a prescription she had requested a refill the day before from clinic for Hydrocodone-acetaminophen.    She reports her significant other went to the pharmacy to pick it up but it was not there.    Discussed with patient that Based on review of records it appears to be denied by insurance last evening pending a prior authorization.    Discussed with patient that I initiated a Emergency Prior Authorization on behalf of Dr. Bhat with her insurance company [employer].  This was performed by leaving voicemail message on their emergency line per their automated phone instructions on their 800 number provided in the denial message.    Called the patient after not having heard anything for 30 minutes from [employer] and asked her to call the company via the number on her benefits card to help with moving the process.      Updated Dr. Bhat    As of 9:15 pm had not heard from [employer]

## 2020-02-10 DIAGNOSIS — Z98.890 S/P ACL RECONSTRUCTION: Primary | ICD-10-CM

## 2020-02-12 ENCOUNTER — ANCILLARY PROCEDURE (OUTPATIENT)
Dept: GENERAL RADIOLOGY | Facility: CLINIC | Age: 30
End: 2020-02-12
Attending: ORTHOPAEDIC SURGERY
Payer: COMMERCIAL

## 2020-02-12 ENCOUNTER — OFFICE VISIT (OUTPATIENT)
Dept: ORTHOPEDICS | Facility: CLINIC | Age: 30
End: 2020-02-12
Payer: COMMERCIAL

## 2020-02-12 DIAGNOSIS — Z98.890 S/P ACL RECONSTRUCTION: ICD-10-CM

## 2020-02-12 DIAGNOSIS — Z98.890 S/P ACL RECONSTRUCTION: Primary | ICD-10-CM

## 2020-02-12 NOTE — PROGRESS NOTES
DIAGNOSIS:   1. Right ACL tear  2. Right lateral meniscus tear    PROCEDURES:  1. ACL reconstruction bone tendon bone autograft, suture based repair of lateral meniscus, date of surgery 2/4/2020    HISTORY:  Doing well 1 week out from surgery.  Still taking narcotics.  Weaning from her pain pills.  Already started therapy.    EXAM:     General: Awake, Alert, and oriented. Articulates and communicates with a normal affect     Right lower Extremity:    Incisions well healed without evidence of infection    Normal post-operative effusion and ecchymosis    Range of motion and stability exam not performed    Neurovascularly intact    IMAGING:  AP lateral radiographs of tunnels and hardware in good position    ASSESSMENT:  1. 1 week following ACL reconstruction bone tendon bone autograft and lateral meniscus repair    PLAN:     Toe-touch weightbearing x4 weeks then begin progressive weightbearing as tolerated in extension with a goal to be fully weightbearing at 6 weeks    Range of motion 0 to 90 degrees    Hinged knee brace on and locked when up and around, off for unlocked for sitting or therapy    Sutures removed in clinic    Leave steri-strips in place until they fall off    OK to shower allowing water to run over incision    No soaking, scrubbing, baths, or lake for 1 additional week    Continue PT as scheduled     Pain medications reviewed and no refills required.     Operative report provided and arthroscopic images reviewed    Follow up at 6 weeks from the date of surgery with no new X-Rays needed

## 2020-02-12 NOTE — LETTER
2/12/2020      RE: Yany Tolliver  [address] [place] [address]       DIAGNOSIS:   1. Right ACL tear  2. Right lateral meniscus tear    PROCEDURES:  1. ACL reconstruction bone tendon bone autograft, suture based repair of lateral meniscus, date of surgery 2/4/2020    HISTORY:  Doing well 1 week out from surgery.  Still taking narcotics.  Weaning from her pain pills.  Already started therapy.    EXAM:     General: Awake, Alert, and oriented. Articulates and communicates with a normal affect     Right lower Extremity:    Incisions well healed without evidence of infection    Normal post-operative effusion and ecchymosis    Range of motion and stability exam not performed    Neurovascularly intact    IMAGING:  AP lateral radiographs of tunnels and hardware in good position    ASSESSMENT:  1. 1 week following ACL reconstruction bone tendon bone autograft and lateral meniscus repair    PLAN:     Toe-touch weightbearing x4 weeks then begin progressive weightbearing as tolerated in extension with a goal to be fully weightbearing at 6 weeks    Range of motion 0 to 90 degrees    Hinged knee brace on and locked when up and around, off for unlocked for sitting or therapy    Sutures removed in clinic    Leave steri-strips in place until they fall off    OK to shower allowing water to run over incision    No soaking, scrubbing, baths, or lake for 1 additional week    Continue PT as scheduled     Pain medications reviewed and no refills required.     Operative report provided and arthroscopic images reviewed    Follow up at 6 weeks from the date of surgery with no new X-Rays needed           Stefan Bhat MD

## 2020-02-18 ENCOUNTER — MEDICAL CORRESPONDENCE (OUTPATIENT)
Dept: HEALTH INFORMATION MANAGEMENT | Facility: CLINIC | Age: 30
End: 2020-02-18

## 2020-03-11 ENCOUNTER — HEALTH MAINTENANCE LETTER (OUTPATIENT)
Age: 30
End: 2020-03-11

## 2020-03-19 ENCOUNTER — TELEPHONE (OUTPATIENT)
Dept: ORTHOPEDICS | Facility: CLINIC | Age: 30
End: 2020-03-19

## 2020-03-19 NOTE — TELEPHONE ENCOUNTER
Called and left voicemail for patient.     Explained that due to COVID-19, [facility] is taking steps to try to limit potential patient exposures by reducing face to face visits. A way to do this is by offering telephone visits as an alternative option for their follow-up care.    We have reviewed schedules with Dr. Bhat and together feel that their follow-up appointment, currently scheduled on 3/23/20, could be done via telephone.    Telephone Visit  Billing information reviewed: No, explain: Did not reach patient  Time information reviewed: Yes  Contact Number Verified: No, explain: Did not reach patient.    Call back number was left.     Deneen ATC

## 2020-03-23 ENCOUNTER — VIRTUAL VISIT (OUTPATIENT)
Dept: ORTHOPEDICS | Facility: CLINIC | Age: 30
End: 2020-03-23
Payer: COMMERCIAL

## 2020-03-23 DIAGNOSIS — Z98.890 STATUS POST RIGHT KNEE SURGERY: Primary | ICD-10-CM

## 2020-03-23 NOTE — PROGRESS NOTES
DIAGNOSIS:   1. Right ACL tear  2. Right lateral meniscus tear    PROCEDURES:  1. ACL reconstruction bone tendon bone autograft, suture based repair of lateral meniscus, date of surgery 2/4/2020    HISTORY:  Doing well 6 weeks out from surgery.  Pain controlled.  Off narcotics.  Doing therapy.    EXAM:       No exam was completed she denies any problems with wound healing and states that her motion is full    IMAGING:  No new imaging    ASSESSMENT:  1. 6 week following ACL reconstruction bone tendon bone autograft and lateral meniscus repair    PLAN:   Weightbearing: WBAT  Range of Motion: No range of motion restrictions  Pain Medications: We reviewed post-operative pain medications at today's visit. The patient has stopped all opioid pain medications and no further refills are required  Extension: We reviewed the importance of full knee extension and demonstrated the relevant exercises as appropriate  Crutches/Brace: Patient no longer requires the hinged knee brace or crutches.   Acitivity Restrictions:  Discussed that this is the dangerous time after ACL reconstruction  Reviewed activity restrictions at today's visit  Goal to progress strength and motion to allow straight line running at three months from the date of surgery      Follow up: 6 weeks with no new radiographs needed

## 2020-07-27 ENCOUNTER — TELEPHONE (OUTPATIENT)
Dept: ORTHOPEDICS | Facility: CLINIC | Age: 30
End: 2020-07-27

## 2020-07-27 NOTE — TELEPHONE ENCOUNTER
Spoke with Giana and let her know we have not received her fax form quite yet.  I did assure her that I will reach out when we send it to her insurance company so that she is aware of the process.  She thanked me for the call.     - Seymour DE LA GARZA ATC

## 2020-07-27 NOTE — TELEPHONE ENCOUNTER
[facility] Call Center    Phone Message    May a detailed message be left on voicemail: yes     Reason for Call: Other: Pt. called and indicates she has communicated with her insurance regarding an MRI performed at the [facility] when she had a visit with Dr. Alvarado.  Pt. states insurance indicates the MRI was not preauthorized.  Pt. needs help with an appeal, [employer] Inurance Claim# [id number] Date of Service MRI 11/06/2019.  Pt. is faxing appeal form to the [facility]. Please call Pt. back at [phone]     Action Taken: Message routed to:  [facility] ortho     Travel Screening: Not Applicable

## 2020-07-28 ENCOUNTER — DOCUMENTATION ONLY (OUTPATIENT)
Dept: ORTHOPEDICS | Facility: CLINIC | Age: 30
End: 2020-07-28

## 2020-07-28 NOTE — PROGRESS NOTES
Faxed Giana's appeal paperwork to the number provided on the bottom of the page.  Placed paperwork in scan file folder.     - Seymour DE LA GARZA ATC

## 2020-10-12 NOTE — TELEPHONE ENCOUNTER
"R knee surgical procedure w/ Dr Bhat, [facility] on 2/4. Pt states she spoke w/ Megan at Dr. Bhat's office yesterday and was told an Rx for pain med would be sent to pharmacy. Pharmacy tells pt they rec'd the Rx but are waiting for the doctor to \"sign off\" on it. In EHR, Rx states \"waiting for response\" from provider. This is an e-script for S2 med so can only be handled by provider. Paged on-call for [facility] @4:33 pm to call pt at [phone]. Advised pt to cb if no call w/i 20-30 min. Pt voiced understanding and agreement.       Reason for Disposition    Caller has NON-URGENT medication question about med that PCP prescribed and triager unable to answer question    Additional Information    Negative: Drug overdose and nurse unable to answer question    Negative: Caller requesting information not related to medicine    Negative: Caller requesting a prescription for Strep throat and has a positive culture result    Negative: Rash while taking a medication or within 3 days of stopping it    Negative: Immunization reaction suspected    Negative: [1] Asthma and [2] having symptoms of asthma (cough, wheezing, etc)    Negative: MORE THAN A DOUBLE DOSE of a prescription or over-the-counter (OTC) drug    Negative: [1] DOUBLE DOSE (an extra dose or lesser amount) of over-the-counter (OTC) drug AND [2] any symptoms (e.g., dizziness, nausea, pain, sleepiness)    Negative: [1] DOUBLE DOSE (an extra dose or lesser amount) of prescription drug AND [2] any symptoms (e.g., dizziness, nausea, pain, sleepiness)    Negative: Took another person's prescription drug    Negative: [1] DOUBLE DOSE (an extra dose or lesser amount) of prescription drug AND [2] NO symptoms (Exception: a double dose of antibiotics)    Negative: Diabetes drug error or overdose (e.g., insulin or extra dose)    Negative: [1] Request for URGENT new prescription or refill of \"essential\" medication (i.e., likelihood of harm to patient if not " taken) AND [2] triager unable to fill per unit policy    Negative: [1] Prescription not at pharmacy AND [2] was prescribed today by PCP    Negative: Pharmacy calling with prescription questions and triager unable to answer question    Negative: Caller has URGENT medication question about med that PCP prescribed and triager unable to answer question    Protocols used: MEDICATION QUESTION CALL-A-AH       4 weeks

## 2021-01-03 ENCOUNTER — HEALTH MAINTENANCE LETTER (OUTPATIENT)
Age: 31
End: 2021-01-03

## 2021-03-12 ENCOUNTER — TRANSFERRED RECORDS (OUTPATIENT)
Dept: HEALTH INFORMATION MANAGEMENT | Facility: CLINIC | Age: 31
End: 2021-03-12

## 2021-03-15 ENCOUNTER — OFFICE VISIT (OUTPATIENT)
Dept: ORTHOPEDICS | Facility: CLINIC | Age: 31
End: 2021-03-15
Payer: COMMERCIAL

## 2021-03-15 VITALS — WEIGHT: 140 LBS | BODY MASS INDEX: 21.97 KG/M2 | HEIGHT: 67 IN

## 2021-03-15 DIAGNOSIS — Z98.890 STATUS POST RIGHT KNEE SURGERY: Primary | ICD-10-CM

## 2021-03-15 PROCEDURE — 99213 OFFICE O/P EST LOW 20 MIN: CPT | Performed by: ORTHOPAEDIC SURGERY

## 2021-03-15 ASSESSMENT — MIFFLIN-ST. JEOR: SCORE: 1397.67

## 2021-03-15 NOTE — LETTER
"    3/15/2021         RE: Yany Tolliver  [address] [place] [address]        Dear Colleague,    Thank you for referring your patient, Yany Tolliver, to the [facility]. Please see a copy of my visit note below.    Reason For Visit:   Chief Complaint   Patient presents with     RECHECK     DOS 2/4/20 R knee ACL reconstruction bone tendon bone autograft and lateral meniscal repair       Date of surgery: 2/4/2020  Type of surgery:   PROCEDURE:  1. Examination under anesthesia right knee  2. Right knee arthroscopy  3. ACL reconstruction bone tendon bone autograft  4. All inside suture based repair of radial oblique tear of lateral meniscus    5. Partial meniscectomy of the mid body of the lateral meniscus    Functional test results at [facility] in [place].   Took break from physical therapy at end of Summer and during winter. Did not pass hop test and not cleared to return to soccer.      at coffee shop.    Pain Assessment  Patient Currently in Pain: No    Ht 1.702 m (5' 7\")   Wt 63.5 kg (140 lb)   BMI 21.93 kg/m         No Known Allergies    Current Outpatient Medications   Medication     HYDROcodone-acetaminophen (NORCO) 5-325 MG tablet     No current facility-administered medications for this visit.          Odilia Mckeon, ATC      DIAGNOSIS:   1. Right ACL tear  2. Right lateral meniscus tear    PROCEDURES:  1. ACL reconstruction bone tendon bone autograft, suture based repair of lateral meniscus, date of surgery 2/4/2020    HISTORY:  Doing well 12 months out from surgery.  No pain, some weakness still noted on functional testing    EXAM:      Right knee    ROM  Is full and equal to the contralateral side    Stable to varus and valgus    Anterior and posterior draw is stable    lachman 0, no pivot shift    IMAGING:  No new imaging    ASSESSMENT:  1. 12 months following ACL reconstruction bone tendon bone autograft " and lateral meniscus repair    PLAN:   WBAT  ROM as tolerated  Transition back to activities as tolerated  Work with PT to define a plan for sports  F/u PRN              Again, thank you for allowing me to participate in the care of your patient.        Sincerely,        Stefan Bhat MD

## 2021-03-15 NOTE — PROGRESS NOTES
DIAGNOSIS:   1. Right ACL tear  2. Right lateral meniscus tear    PROCEDURES:  1. ACL reconstruction bone tendon bone autograft, suture based repair of lateral meniscus, date of surgery 2/4/2020    HISTORY:  Doing well 12 months out from surgery.  No pain, some weakness still noted on functional testing    EXAM:      Right knee    ROM  Is full and equal to the contralateral side    Stable to varus and valgus    Anterior and posterior draw is stable    lachman 0, no pivot shift    IMAGING:  No new imaging    ASSESSMENT:  1. 12 months following ACL reconstruction bone tendon bone autograft and lateral meniscus repair    PLAN:   WBAT  ROM as tolerated  Transition back to activities as tolerated  Work with PT to define a plan for sports  F/u PRN

## 2021-03-15 NOTE — PROGRESS NOTES
"Reason For Visit:   Chief Complaint   Patient presents with     RECHECK     DOS 2/4/20 R knee ACL reconstruction bone tendon bone autograft and lateral meniscal repair       Date of surgery: 2/4/2020  Type of surgery:   PROCEDURE:  1. Examination under anesthesia right knee  2. Right knee arthroscopy  3. ACL reconstruction bone tendon bone autograft  4. All inside suture based repair of radial oblique tear of lateral meniscus    5. Partial meniscectomy of the mid body of the lateral meniscus    Functional test results at [facility] in [place].   Took break from physical therapy at end of Summer and during winter. Did not pass hop test and not cleared to return to soccer.      at coffee shop.    Pain Assessment  Patient Currently in Pain: No    Ht 1.702 m (5' 7\")   Wt 63.5 kg (140 lb)   BMI 21.93 kg/m         No Known Allergies    Current Outpatient Medications   Medication     HYDROcodone-acetaminophen (NORCO) 5-325 MG tablet     No current facility-administered medications for this visit.          Odilia Mckeon, ATC    "

## 2021-03-15 NOTE — LETTER
Date:March 16, 2021      Patient was self referred, no letter generated. Do not send.        [facility] Health Information

## 2021-04-25 ENCOUNTER — HEALTH MAINTENANCE LETTER (OUTPATIENT)
Age: 31
End: 2021-04-25

## 2021-10-10 ENCOUNTER — HEALTH MAINTENANCE LETTER (OUTPATIENT)
Age: 31
End: 2021-10-10

## 2022-04-21 ENCOUNTER — OFFICE VISIT (OUTPATIENT)
Dept: OBGYN | Facility: CLINIC | Age: 32
End: 2022-04-21
Attending: OBSTETRICS & GYNECOLOGY
Payer: COMMERCIAL

## 2022-04-21 VITALS
HEIGHT: 67 IN | SYSTOLIC BLOOD PRESSURE: 130 MMHG | HEART RATE: 66 BPM | BODY MASS INDEX: 22.18 KG/M2 | WEIGHT: 141.3 LBS | DIASTOLIC BLOOD PRESSURE: 80 MMHG

## 2022-04-21 DIAGNOSIS — Z12.4 SCREENING FOR MALIGNANT NEOPLASM OF CERVIX: ICD-10-CM

## 2022-04-21 DIAGNOSIS — N93.9 ABNORMAL UTERINE BLEEDING (AUB): ICD-10-CM

## 2022-04-21 DIAGNOSIS — R10.2 PELVIC PAIN IN FEMALE: ICD-10-CM

## 2022-04-21 DIAGNOSIS — Z00.00 PREVENTATIVE HEALTH CARE: Primary | ICD-10-CM

## 2022-04-21 PROCEDURE — 87491 CHLMYD TRACH DNA AMP PROBE: CPT | Performed by: OBSTETRICS & GYNECOLOGY

## 2022-04-21 PROCEDURE — 87591 N.GONORRHOEAE DNA AMP PROB: CPT | Performed by: OBSTETRICS & GYNECOLOGY

## 2022-04-21 PROCEDURE — 99213 OFFICE O/P EST LOW 20 MIN: CPT | Mod: 25 | Performed by: OBSTETRICS & GYNECOLOGY

## 2022-04-21 PROCEDURE — G0145 SCR C/V CYTO,THINLAYER,RESCR: HCPCS | Performed by: OBSTETRICS & GYNECOLOGY

## 2022-04-21 PROCEDURE — 99385 PREV VISIT NEW AGE 18-39: CPT | Performed by: OBSTETRICS & GYNECOLOGY

## 2022-04-21 PROCEDURE — G0463 HOSPITAL OUTPT CLINIC VISIT: HCPCS

## 2022-04-21 RX ORDER — TRANEXAMIC ACID 650 MG/1
1300 TABLET ORAL 3 TIMES DAILY
Qty: 30 TABLET | Refills: 3 | Status: SHIPPED | OUTPATIENT
Start: 2022-04-21

## 2022-04-21 NOTE — LETTER
Date:April 27, 2022      Patient was self referred, no letter generated. Do not send.        [facility] Health Information

## 2022-04-21 NOTE — NURSING NOTE
Chief Complaint   Patient presents with     Physical     Concerned about menstrual cycles and painful cramps.       See DHRUV Strange 4/21/2022

## 2022-04-21 NOTE — PATIENT INSTRUCTIONS
Return May 9th at 11am for IUD insertion    Plan for Kyleena IUD. Other option is Mirena IUD    Take lysteda (tranexamic acid) 3 times per day for up to 5 days to decrease periods    Down the road- other options for management    - orlissa (shuts down ovaries, similar to menopause  - laparoscopy (surgery to look for endometriosis

## 2022-04-21 NOTE — LETTER
4/21/2022       RE: Yany Tolliver  [address] [place] [address]     Dear Colleague,    Thank you for referring your patient, Yany Tolliver, to the [facility] [place] at [facility]. Please see a copy of my visit note below.    CC/HPI:   Yany Tolliver is a 31 year old female  who presents today for her annual exam and to establish care. She has been struggling with dysmenorrhea for several years. Previously she used OCPs briefly, unsure if it helped pain and did not like the side effects of her medication. She is not currently planning pregnancy, uses withdrawal method for contraception.     Pain starts with onset menses, has severe cramping at times requiring her to miss work. Bleeding is moderate/heavy with some clots. Uses ibuprofen with minimal relief. Has tried exercise with minimal improvement- is already fairly active. Pain resolves with end of menses. Denies intermenstrual bleeding or pain outside of menses.     HISTORIES:    History reviewed. No pertinent past medical history.     Past Surgical History:   Procedure Laterality Date     ARTHROSCOPIC RECONSTRUCTION ANTERIOR CRUCIATE LIGAMENT BONE TENDON BONE AUTOGRAFT Right 2/4/2020    Procedure: Examination under anesthesia Right knee, Right anterior cruciate ligament reconstruction bone tendon bone autograft;  Surgeon: Stefan Bhat MD;  Location:  OR     ARTHROSCOPY KNEE WITH MENISCAL REPAIR Right 2/4/2020    Procedure: Right knee lateral meniscal repair;  Surgeon: Stefan Bhat MD;  Location:  OR     Current Outpatient Medications   Medication Sig Dispense Refill     tranexamic acid (LYSTEDA) 650 MG tablet Take 2 tablets (1,300 mg) by mouth 3 times daily 30 tablet 3     No Known Allergies  Social History     Socioeconomic History     Marital status:      Spouse name: Not on file     Number of children: Not on file  "    Years of education: Not on file     Highest education level: Not on file   Occupational History     Not on file   Tobacco Use     Smoking status: Never Smoker     Smokeless tobacco: Never Used   Vaping Use     Vaping Use: Never used   Substance and Sexual Activity     Alcohol use: Yes     Drug use: Never     Sexual activity: Yes     Partners: Male     Birth control/protection: Condom        Gyn Hx:   Patient's last menstrual period was 04/07/2022 (exact date).  Menses regular, pain as above  Last pap 2015- NIL    Review Of Systems:  CONSTITUTIONAL: NEGATIVE for fever, chills  EYES: NEGATIVE for vision changes   RESP: NEGATIVE for significant cough or SOB  CV: NEGATIVE for chest pain, palpitations   GI: NEGATIVE for nausea, abdominal pain, heartburn, or change in bowel habits  : NEGATIVE for frequency, dysuria, or hematuria  MUSCULOSKELETAL: NEGATIVE for significant arthralgias or myalgia  NEURO: NEGATIVE for weakness, dizziness or paresthesias or headache    EXAM:  /80 (BP Location: Left arm, Patient Position: Chair)   Pulse 66   Ht 1.702 m (5' 7\")   Wt 64.1 kg (141 lb 4.8 oz)   LMP 04/07/2022 (Exact Date)   Breastfeeding No   BMI 22.13 kg/m    Body mass index is 22.13 kg/m .    General - pleasant female in no acute distress.  Skin - no suspicious lesions or rashes  EENT-  PERRLA, euthyroid with out palpable nodules  Neck - supple without lymphadenopathy.  Lungs - clear to auscultation bilaterally.  Heart - regular rate and rhythm without murmur.  Breasts- symmetrical . No dominant fixed or suspicious masses noted.  No skin or nipple changes or axillary nodes.  Abdomen - soft, nontender, nondistended, no masses or organomegaly noted.  Musculoskeletal - no gross deformities.  Neurological - normal strength, sensation, and mental status.  Pelvic - EG: normal  female, vulva reveals no erythema or lesions.   BUS: within normal limits.  Vagina: well rugated, no lesions polyps or suspicious  discharge.   "   Cervix: no lesions, polyps discharge or CMT.  Uterus: firm, anteverted, normal size and nontender.  Adnexa: no masses or tenderness.  Anus- normal, no lesions.  Rectovaginal - deferred.    ASSESSMENT/PLAN  31 year old here for annual exam and establish care  - pap smear today  - GC/CT    Reviewed dysmenorrhea- options for evaluation and treatment of dysmenorrhea. Possible endometriosis based on symptoms. Discussed options ranging from non-hormonal options for management of heavy bleeding such as lysteda as well as hormonal options for management of symptoms including OCP, nuva ring, mirena IUD, nexplanon as well as possible treatments for endometriosis- orlissa or diagnostic laparoscopy for definitive diagnosis.     She is hesitant to use hormonal medication, so would like to start with trial of lysteda while she considers Kyleena IUD. She will return for IUD placement unless lysteda is helpful.     Di Richmond MD              Again, thank you for allowing me to participate in the care of your patient.      Sincerely,    Di Richmond MD

## 2022-04-22 LAB
C TRACH DNA SPEC QL NAA+PROBE: NEGATIVE
N GONORRHOEA DNA SPEC QL NAA+PROBE: NEGATIVE

## 2022-04-25 LAB
BKR LAB AP GYN ADEQUACY: NORMAL
BKR LAB AP GYN INTERPRETATION: NORMAL
BKR LAB AP HPV REFLEX: NORMAL
BKR LAB AP LMP: NORMAL
BKR LAB AP PREVIOUS ABNORMAL: NORMAL
PATH REPORT.COMMENTS IMP SPEC: NORMAL
PATH REPORT.COMMENTS IMP SPEC: NORMAL
PATH REPORT.RELEVANT HX SPEC: NORMAL

## 2022-04-26 NOTE — PROGRESS NOTES
CC/HPI:   Yany Tolliver is a 31 year old female  who presents today for her annual exam and to establish care. She has been struggling with dysmenorrhea for several years. Previously she used OCPs briefly, unsure if it helped pain and did not like the side effects of her medication. She is not currently planning pregnancy, uses withdrawal method for contraception.     Pain starts with onset menses, has severe cramping at times requiring her to miss work. Bleeding is moderate/heavy with some clots. Uses ibuprofen with minimal relief. Has tried exercise with minimal improvement- is already fairly active. Pain resolves with end of menses. Denies intermenstrual bleeding or pain outside of menses.     HISTORIES:    History reviewed. No pertinent past medical history.     Past Surgical History:   Procedure Laterality Date     ARTHROSCOPIC RECONSTRUCTION ANTERIOR CRUCIATE LIGAMENT BONE TENDON BONE AUTOGRAFT Right 2/4/2020    Procedure: Examination under anesthesia Right knee, Right anterior cruciate ligament reconstruction bone tendon bone autograft;  Surgeon: Stefan Bhat MD;  Location: [facility] OR     ARTHROSCOPY KNEE WITH MENISCAL REPAIR Right 2/4/2020    Procedure: Right knee lateral meniscal repair;  Surgeon: Stefan Bhat MD;  Location:  OR     Current Outpatient Medications   Medication Sig Dispense Refill     tranexamic acid (LYSTEDA) 650 MG tablet Take 2 tablets (1,300 mg) by mouth 3 times daily 30 tablet 3     No Known Allergies  Social History     Socioeconomic History     Marital status:      Spouse name: Not on file     Number of children: Not on file     Years of education: Not on file     Highest education level: Not on file   Occupational History     Not on file   Tobacco Use     Smoking status: Never Smoker     Smokeless tobacco: Never Used   Vaping Use     Vaping Use: Never used   Substance and Sexual Activity     Alcohol use: Yes     Drug use: Never     Sexual  "activity: Yes     Partners: Male     Birth control/protection: Condom        Gyn Hx:   Patient's last menstrual period was 04/07/2022 (exact date).  Menses regular, pain as above  Last pap 2015- NIL    Review Of Systems:  CONSTITUTIONAL: NEGATIVE for fever, chills  EYES: NEGATIVE for vision changes   RESP: NEGATIVE for significant cough or SOB  CV: NEGATIVE for chest pain, palpitations   GI: NEGATIVE for nausea, abdominal pain, heartburn, or change in bowel habits  : NEGATIVE for frequency, dysuria, or hematuria  MUSCULOSKELETAL: NEGATIVE for significant arthralgias or myalgia  NEURO: NEGATIVE for weakness, dizziness or paresthesias or headache    EXAM:  /80 (BP Location: Left arm, Patient Position: Chair)   Pulse 66   Ht 1.702 m (5' 7\")   Wt 64.1 kg (141 lb 4.8 oz)   LMP 04/07/2022 (Exact Date)   Breastfeeding No   BMI 22.13 kg/m    Body mass index is 22.13 kg/m .    General - pleasant female in no acute distress.  Skin - no suspicious lesions or rashes  EENT-  PERRLA, euthyroid with out palpable nodules  Neck - supple without lymphadenopathy.  Lungs - clear to auscultation bilaterally.  Heart - regular rate and rhythm without murmur.  Breasts- symmetrical . No dominant fixed or suspicious masses noted.  No skin or nipple changes or axillary nodes.  Abdomen - soft, nontender, nondistended, no masses or organomegaly noted.  Musculoskeletal - no gross deformities.  Neurological - normal strength, sensation, and mental status.  Pelvic - EG: normal  female, vulva reveals no erythema or lesions.   BUS: within normal limits.  Vagina: well rugated, no lesions polyps or suspicious  discharge.     Cervix: no lesions, polyps discharge or CMT.  Uterus: firm, anteverted, normal size and nontender.  Adnexa: no masses or tenderness.  Anus- normal, no lesions.  Rectovaginal - deferred.    ASSESSMENT/PLAN  31 year old here for annual exam and establish care  - pap smear today  - GC/CT    Reviewed dysmenorrhea- " options for evaluation and treatment of dysmenorrhea. Possible endometriosis based on symptoms. Discussed options ranging from non-hormonal options for management of heavy bleeding such as lysteda as well as hormonal options for management of symptoms including OCP, nuva ring, mirena IUD, nexplanon as well as possible treatments for endometriosis- orlissa or diagnostic laparoscopy for definitive diagnosis.     She is hesitant to use hormonal medication, so would like to start with trial of lysteda while she considers Kyleena IUD. She will return for IUD placement unless lysteda is helpful.     Di Richmond MD

## 2022-08-09 ENCOUNTER — OFFICE VISIT (OUTPATIENT)
Dept: OBGYN | Facility: CLINIC | Age: 32
End: 2022-08-09
Attending: OBSTETRICS & GYNECOLOGY
Payer: COMMERCIAL

## 2022-08-09 VITALS
DIASTOLIC BLOOD PRESSURE: 75 MMHG | WEIGHT: 137.9 LBS | HEART RATE: 62 BPM | SYSTOLIC BLOOD PRESSURE: 119 MMHG | HEIGHT: 67 IN | BODY MASS INDEX: 21.64 KG/M2

## 2022-08-09 DIAGNOSIS — Z30.430 ENCOUNTER FOR INSERTION OF INTRAUTERINE CONTRACEPTIVE DEVICE: Primary | ICD-10-CM

## 2022-08-09 PROCEDURE — 58300 INSERT INTRAUTERINE DEVICE: CPT | Performed by: OBSTETRICS & GYNECOLOGY

## 2022-08-09 PROCEDURE — G0463 HOSPITAL OUTPT CLINIC VISIT: HCPCS

## 2022-08-09 NOTE — PROGRESS NOTES
"  IUD Insertion:  CONSULT:    Is a pregnancy test required: Yes.  Was it positive or negative?  Negative  Was a consent obtained?  Yes    Subjective: Yany Tolliver is a 31 year old  presents for IUD and desires Mirena type IUD due to history of heavy, painful periods.    Patient has been given the opportunity to ask questions about all forms of birth control, including all options appropriate for Yany Tolliver. Discussed that no method of birth control, except abstinence is 100% effective against pregnancy or sexually transmitted infection.     Yany Tolliver understands she may have the IUD removed at any time. IUD should be removed by a health care provider.    The entire insertion procedure was reviewed with the patient, including care after placement.    Patient's last menstrual period was 2022. No allergy to betadine or shellfish.  HCG Qual Urine   Date Value Ref Range Status   2020 Negative neg Final         /75   Pulse 62   Ht 1.702 m (5' 7\")   Wt 62.6 kg (137 lb 14.4 oz)   LMP 2022   BMI 21.60 kg/m      Pelvic Exam:   EG/BUS: normal genital architecture without lesions, erythema or abnormal secretions.   Vagina: moist, pink, rugae with physiologic discharge and secretions  Cervix: Nulliparous no lesions and pink, moist, closed, without lesion or CMT  Uterus: Midline, mobile    PROCEDURE NOTE: -- IUD Insertion    Reason for Insertion: dysmenorrhea, abnormal uterine bleeding    Premedicated with ibuprofen.  Under sterile technique, cervix was visualized with speculum and prepped with Betadine solution swab x 3. Tenaculum was placed for stability. The uterus was gently straightened and sounded to 7.0 cm. IUD prepared for placement, and IUD inserted according to 's instructions without difficulty or significant resitance, and deployed at the fundus. The strings were visualized and trimmed to 3.0 cm from the external os. Tenaculum was removed " and hemostasis noted. Speculum removed.  Patient tolerated procedure with some pain.    Lot # [id number]  Exp: Oct 2024    EBL: minimal    Complications: none    ASSESSMENT:     ICD-10-CM    1. Encounter for insertion of intrauterine contraceptive device  Z30.430 levonorgestrel (MIRENA) 20 MCG/DAY IUD     levonorgestrel (MIRENA) 20 MCG/DAY IUD 20 mcg     INSERTION INTRAUTERINE DEVICE        PLAN:    Given 's handouts, including when to have IUD removed, list of danger s/sx, side effects and follow up recommended. Encouraged condom use for prevention of STD. Back up contraception advised for 7 days if progestin method. Advised to call for any fever, for prolonged or severe pain or bleeding, abnormal vaginal discharge, or unable to palpate strings. She was advised to use pain medications (ibuprofen) as needed for mild to moderate pain. Advised to follow-up in clinic in 4-6 weeks for IUD string check if unable to find strings or as directed by provider.     Derek Segura MD  OB/GYN PGY-1  08/09/2022 11:35 AM    Appreciate note by Dr. Segura. Patient has been seen and examined by me with the resident, agree with above note. I was present for above procedure.     Di Richmond MD

## 2022-08-09 NOTE — LETTER
Date:August 9, 2022      Patient was self referred, no letter generated. Do not send.        [facility] Health Information

## 2022-08-09 NOTE — LETTER
"2022       RE: Yany Tolliver  [address] [place] [address]     Dear Colleague,    Thank you for referring your patient, Yany Tolliver, to the [facility] [place] at [facility]. Please see a copy of my visit note below.      IUD Insertion:  CONSULT:    Is a pregnancy test required: Yes.  Was it positive or negative?  Negative  Was a consent obtained?  Yes    Subjective: Yany Tolliver is a 31 year old  presents for IUD and desires Mirena type IUD due to history of heavy, painful periods.    Patient has been given the opportunity to ask questions about all forms of birth control, including all options appropriate for Yany Tolliver. Discussed that no method of birth control, except abstinence is 100% effective against pregnancy or sexually transmitted infection.     Yany Tolliver understands she may have the IUD removed at any time. IUD should be removed by a health care provider.    The entire insertion procedure was reviewed with the patient, including care after placement.    Patient's last menstrual period was 2022. No allergy to betadine or shellfish.  HCG Qual Urine   Date Value Ref Range Status   2020 Negative neg Final         /75   Pulse 62   Ht 1.702 m (5' 7\")   Wt 62.6 kg (137 lb 14.4 oz)   LMP 2022   BMI 21.60 kg/m      Pelvic Exam:   EG/BUS: normal genital architecture without lesions, erythema or abnormal secretions.   Vagina: moist, pink, rugae with physiologic discharge and secretions  Cervix: Nulliparous no lesions and pink, moist, closed, without lesion or CMT  Uterus: Midline, mobile    PROCEDURE NOTE: -- IUD Insertion    Reason for Insertion: dysmenorrhea, abnormal uterine bleeding    Premedicated with ibuprofen.  Under sterile technique, cervix was visualized with speculum and prepped with Betadine solution swab x 3. Tenaculum was placed for " stability. The uterus was gently straightened and sounded to 7.0 cm. IUD prepared for placement, and IUD inserted according to 's instructions without difficulty or significant resitance, and deployed at the fundus. The strings were visualized and trimmed to 3.0 cm from the external os. Tenaculum was removed and hemostasis noted. Speculum removed.  Patient tolerated procedure with some pain.    Lot # [id number]  Exp: Oct 2024    EBL: minimal    Complications: none    ASSESSMENT:     ICD-10-CM    1. Encounter for insertion of intrauterine contraceptive device  Z30.430 levonorgestrel (MIRENA) 20 MCG/DAY IUD     levonorgestrel (MIRENA) 20 MCG/DAY IUD 20 mcg     INSERTION INTRAUTERINE DEVICE        PLAN:    Given 's handouts, including when to have IUD removed, list of danger s/sx, side effects and follow up recommended. Encouraged condom use for prevention of STD. Back up contraception advised for 7 days if progestin method. Advised to call for any fever, for prolonged or severe pain or bleeding, abnormal vaginal discharge, or unable to palpate strings. She was advised to use pain medications (ibuprofen) as needed for mild to moderate pain. Advised to follow-up in clinic in 4-6 weeks for IUD string check if unable to find strings or as directed by provider.     Derek Segura MD  OB/GYN PGY-1  08/09/2022 11:35 AM    Appreciate note by Dr. Segura. Patient has been seen and examined by me with the resident, agree with above note. I was present for above procedure.     Di Richmond MD            Again, thank you for allowing me to participate in the care of your patient.      Sincerely,    Di Richmond MD

## 2022-09-06 ENCOUNTER — OFFICE VISIT (OUTPATIENT)
Dept: OBGYN | Facility: CLINIC | Age: 32
End: 2022-09-06
Attending: OBSTETRICS & GYNECOLOGY
Payer: COMMERCIAL

## 2022-09-06 VITALS — HEIGHT: 67 IN | BODY MASS INDEX: 21.6 KG/M2

## 2022-09-06 DIAGNOSIS — F43.23 ADJUSTMENT DISORDER WITH MIXED ANXIETY AND DEPRESSED MOOD: ICD-10-CM

## 2022-09-06 DIAGNOSIS — Z30.431 IUD CHECK UP: Primary | ICD-10-CM

## 2022-09-06 PROCEDURE — 99213 OFFICE O/P EST LOW 20 MIN: CPT | Performed by: OBSTETRICS & GYNECOLOGY

## 2022-09-06 PROCEDURE — G0463 HOSPITAL OUTPT CLINIC VISIT: HCPCS

## 2022-09-06 NOTE — LETTER
Date:September 9, 2022      Provider requested that no letter be sent. Do not send.       [facility]

## 2022-09-06 NOTE — LETTER
"9/6/2022       RE: Yany Tolliver  [address] [place] [address]     Dear Colleague,    Thank you for referring your patient, Yany Tolliver, to the [facility] [place] at [facility]. Please see a copy of my visit note below.    [facility]    CC: IUD check    S: 31 year old here for IUD check following MIrena IUD insertion last month. Overall doing okay since insertion. Did have initial spotting followed by longer period. Now just some old blood. Had cramping with period.     Also has been working with therapist regarding anxiety/depression. Is interested in starting selective serotonin reuptake inhibitor. Continues to see therapist regularly.     O: Ht 1.702 m (5' 7\")   LMP 07/28/2022   BMI 21.60 kg/m    General: No distress  Pelvic Exam:  Vulva: No external lesions, normal hair distribution, normal architecture  Vagina: Moist, pink, no abnormal discharge, well rugated, no lesions  Cervix: smooth, pink, no visible lesions, IUD strings seen 2cm from external os. Small amount of old blood in vaginal vault      A:31 year old here for IUD check and mood symptoms    P: IUD appears to be in good position. Will give another few months to see if symptoms lessen.   Discussed starting medication for mood, will start zoloft 50mg. Discussed slow start on for 1 week. Will continue to follow with therapist.   Mychart in 6 weeks to follow up IUD and zoloft.       Di Richmond MD FACOG      Again, thank you for allowing me to participate in the care of your patient.      Sincerely,    Di Richmond MD      "

## 2022-09-08 NOTE — PROGRESS NOTES
"[facility] Visit    CC: IUD check    S: 31 year old here for IUD check following MIrena IUD insertion last month. Overall doing okay since insertion. Did have initial spotting followed by longer period. Now just some old blood. Had cramping with period.     Also has been working with therapist regarding anxiety/depression. Is interested in starting selective serotonin reuptake inhibitor. Continues to see therapist regularly.     O: Ht 1.702 m (5' 7\")   LMP 07/28/2022   BMI 21.60 kg/m    General: No distress  Pelvic Exam:  Vulva: No external lesions, normal hair distribution, normal architecture  Vagina: Moist, pink, no abnormal discharge, well rugated, no lesions  Cervix: smooth, pink, no visible lesions, IUD strings seen 2cm from external os. Small amount of old blood in vaginal vault      A:31 year old here for IUD check and mood symptoms    P: IUD appears to be in good position. Will give another few months to see if symptoms lessen.   Discussed starting medication for mood, will start zoloft 50mg. Discussed slow start on for 1 week. Will continue to follow with therapist.   Barak in 6 weeks to follow up IUD and zoloft.       Di Richmond MD FACOG  "

## 2022-09-18 ENCOUNTER — HEALTH MAINTENANCE LETTER (OUTPATIENT)
Age: 32
End: 2022-09-18

## 2022-10-05 ENCOUNTER — TRANSFERRED RECORDS (OUTPATIENT)
Dept: OBGYN | Facility: CLINIC | Age: 32
End: 2022-10-05

## 2023-01-17 ENCOUNTER — TRANSFERRED RECORDS (OUTPATIENT)
Dept: HEALTH INFORMATION MANAGEMENT | Facility: CLINIC | Age: 33
End: 2023-01-17

## 2023-02-27 ENCOUNTER — TRANSFERRED RECORDS (OUTPATIENT)
Dept: OBGYN | Facility: CLINIC | Age: 33
End: 2023-02-27

## 2023-06-04 ENCOUNTER — HEALTH MAINTENANCE LETTER (OUTPATIENT)
Age: 33
End: 2023-06-04

## 2023-06-14 ENCOUNTER — TELEPHONE (OUTPATIENT)
Dept: OBGYN | Facility: CLINIC | Age: 33
End: 2023-06-14
Payer: COMMERCIAL

## 2023-06-14 DIAGNOSIS — F43.23 ADJUSTMENT DISORDER WITH MIXED ANXIETY AND DEPRESSED MOOD: ICD-10-CM

## 2023-06-14 NOTE — TELEPHONE ENCOUNTER
Refill request received for Sertraline 100 mg.    Tried to reach Giana but received voicemail.  Left message to call back to discuss if she wishes to continue this dose or go back to 50 mg as previously discussed..

## 2023-06-29 NOTE — TELEPHONE ENCOUNTER
Pt returned call and would like to change Rx to 50 mg daily instead of 100 mg. Reports she does not need refills as she has enough tablets to get her through a few months, will reach out when fills are needed.   Medications

## 2023-10-27 ENCOUNTER — MYC MEDICAL ADVICE (OUTPATIENT)
Dept: OBGYN | Facility: CLINIC | Age: 33
End: 2023-10-27
Payer: COMMERCIAL

## 2023-10-27 DIAGNOSIS — F43.23 ADJUSTMENT DISORDER WITH MIXED ANXIETY AND DEPRESSED MOOD: ICD-10-CM

## 2024-01-22 ENCOUNTER — OFFICE VISIT (OUTPATIENT)
Dept: OBGYN | Facility: CLINIC | Age: 34
End: 2024-01-22
Attending: OBSTETRICS & GYNECOLOGY
Payer: COMMERCIAL

## 2024-01-22 VITALS
WEIGHT: 142 LBS | HEIGHT: 67 IN | SYSTOLIC BLOOD PRESSURE: 115 MMHG | BODY MASS INDEX: 22.29 KG/M2 | DIASTOLIC BLOOD PRESSURE: 78 MMHG | HEART RATE: 51 BPM

## 2024-01-22 DIAGNOSIS — Z00.00 ENCOUNTER FOR PREVENTIVE CARE: Primary | ICD-10-CM

## 2024-01-22 DIAGNOSIS — F43.23 ADJUSTMENT DISORDER WITH MIXED ANXIETY AND DEPRESSED MOOD: ICD-10-CM

## 2024-01-22 PROCEDURE — 99395 PREV VISIT EST AGE 18-39: CPT | Performed by: OBSTETRICS & GYNECOLOGY

## 2024-01-22 PROCEDURE — 99215 OFFICE O/P EST HI 40 MIN: CPT | Performed by: OBSTETRICS & GYNECOLOGY

## 2024-01-22 ASSESSMENT — ANXIETY QUESTIONNAIRES
2. NOT BEING ABLE TO STOP OR CONTROL WORRYING: SEVERAL DAYS
7. FEELING AFRAID AS IF SOMETHING AWFUL MIGHT HAPPEN: SEVERAL DAYS
6. BECOMING EASILY ANNOYED OR IRRITABLE: SEVERAL DAYS
3. WORRYING TOO MUCH ABOUT DIFFERENT THINGS: MORE THAN HALF THE DAYS
GAD7 TOTAL SCORE: 6
1. FEELING NERVOUS, ANXIOUS, OR ON EDGE: SEVERAL DAYS
5. BEING SO RESTLESS THAT IT IS HARD TO SIT STILL: NOT AT ALL
GAD7 TOTAL SCORE: 6

## 2024-01-22 ASSESSMENT — PATIENT HEALTH QUESTIONNAIRE - PHQ9
5. POOR APPETITE OR OVEREATING: NOT AT ALL
SUM OF ALL RESPONSES TO PHQ QUESTIONS 1-9: 6

## 2024-01-22 NOTE — LETTER
1/22/2024       RE: Yany Tolliver  [address] [place] [address]     Dear Colleague,    Thank you for referring your patient, Yany Tolliver, to the [facility] [place] at [facility]. Please see a copy of my visit note below.    CC/HPI:   Yany Tolliver is a 33 year old female  who presents today for her annual exam. She is currently using IUD Mirena  and would like to continue with this method of birth control.    HISTORIES:  Patient Active Problem List   Diagnosis    Acute pain of right knee     PMHx: Anxiety  Past Surgical History:   Procedure Laterality Date    ARTHROSCOPIC RECONSTRUCTION ANTERIOR CRUCIATE LIGAMENT BONE TENDON BONE AUTOGRAFT Right 2/4/2020    Procedure: Examination under anesthesia Right knee, Right anterior cruciate ligament reconstruction bone tendon bone autograft;  Surgeon: Stefan Bhat MD;  Location: [facility] OR    ARTHROSCOPY KNEE WITH MENISCAL REPAIR Right 2/4/2020    Procedure: Right knee lateral meniscal repair;  Surgeon: Stefan Bhat MD;  Location: [facility] OR     Current Outpatient Medications   Medication Sig Dispense Refill    levonorgestrel (MIRENA) 20 MCG/DAY IUD 1 each (20 mcg) by Intrauterine route once      sertraline (ZOLOFT) 50 MG tablet Take 2 tablets (100 mg) by mouth daily 90 tablet 1    tranexamic acid (LYSTEDA) 650 MG tablet Take 2 tablets (1,300 mg) by mouth 3 times daily 30 tablet 3     No Known Allergies  Social History     Socioeconomic History    Marital status:    Occupational History    Not on file   Tobacco Use    Smoking status: Never    Smokeless tobacco: Never   Vaping Use    Vaping Use: Never used   Substance and Sexual Activity    Alcohol use: Yes    Drug use: Never    Sexual activity: Yes     Partners: Male     Birth control/protection: IUD       FHx: Non-contributory       Gyn Hx:   No LMP recorded. (Menstrual status:  "IUD).  Menses:   No menses with IUD in place, previously had regular, painful heavy menses.     Review Of Systems:  CONSTITUTIONAL: NEGATIVE for fever, chills  EYES: NEGATIVE for vision changes   RESP: NEGATIVE for significant cough or SOB  CV: NEGATIVE for chest pain, palpitations   GI: NEGATIVE for nausea, abdominal pain, heartburn, or change in bowel habits  : NEGATIVE for frequency, dysuria, or hematuria  MUSCULOSKELETAL: NEGATIVE for significant arthralgias or myalgia  NEURO: NEGATIVE for weakness, dizziness or paresthesias or headache    EXAM:  /78   Pulse 51   Ht 1.702 m (5' 7\")   Wt 64.4 kg (142 lb)   BMI 22.24 kg/m    Body mass index is 22.24 kg/m .    General - pleasant female in no acute distress.  Skin - no suspicious lesions or rashes  EENT-  PERRLA, euthyroid with out palpable nodules  Neck - supple without lymphadenopathy.  Breasts- symmetrical . No dominant fixed or suspicious masses noted.  No skin or nipple changes or axillary nodes.   Abdomen - soft, nontender, nondistended, no masses or organomegaly noted.  Musculoskeletal - no gross deformities.  Neurological - normal strength, sensation, and mental status.  Pelvic - EG: normal  female, vulva reveals no erythema or lesions.   BUS: within normal limits.  Vagina: well rugated, no lesions polyps or suspicious  discharge.     Cervix: no lesions, polyps discharge or CMT.  Uterus: firm,  anteverted, normal size and nontender.  Adnexa: no masses or tenderness.  Anus- normal, no lesions.  Rectovaginal - deferred.    ASSESSMENT/PLAN  Annual exam  Refill zoloft  Follow up 1 year or as needed    Di Richmond MD    PHQ9 score: 6  GAD7 score: 6    "

## 2024-01-22 NOTE — PATIENT INSTRUCTIONS
Thank you for trusting us with your care!     If you need to contact us for questions about:  Symptoms, Scheduling & Medical Questions; Non-urgent (2-3 day response) Barak message, Urgent (needing response today) [phone] (if after 3:30pm next day response)   Prescriptions: Please call your Pharmacy   Billing: [facility] [phone] or [facility]:[phone]

## 2024-01-22 NOTE — PROGRESS NOTES
CC/HPI:   Yany Tolliver is a 33 year old female  who presents today for her annual exam. She is currently using IUD Mirena  and would like to continue with this method of birth control.    HISTORIES:  Patient Active Problem List   Diagnosis    Acute pain of right knee     PMHx: Anxiety  Past Surgical History:   Procedure Laterality Date    ARTHROSCOPIC RECONSTRUCTION ANTERIOR CRUCIATE LIGAMENT BONE TENDON BONE AUTOGRAFT Right 2/4/2020    Procedure: Examination under anesthesia Right knee, Right anterior cruciate ligament reconstruction bone tendon bone autograft;  Surgeon: Stefan Bhat MD;  [facility]: [facility]    ARTHROSCOPY KNEE WITH MENISCAL REPAIR Right 2/4/2020    Procedure: Right knee lateral meniscal repair;  Surgeon: Stefan Bhat MD;  Location: [facility]     Current Outpatient Medications   Medication Sig Dispense Refill    levonorgestrel (MIRENA) 20 MCG/DAY IUD 1 each (20 mcg) by Intrauterine route once      sertraline (ZOLOFT) 50 MG tablet Take 2 tablets (100 mg) by mouth daily 90 tablet 1    tranexamic acid (LYSTEDA) 650 MG tablet Take 2 tablets (1,300 mg) by mouth 3 times daily 30 tablet 3     No Known Allergies  Social History     Socioeconomic History    Marital status:    Occupational History    Not on file   Tobacco Use    Smoking status: Never    Smokeless tobacco: Never   Vaping Use    Vaping Use: Never used   Substance and Sexual Activity    Alcohol use: Yes    Drug use: Never    Sexual activity: Yes     Partners: Male     Birth control/protection: IUD       FHx: Non-contributory       Gyn Hx:   No LMP recorded. (Menstrual status: IUD).  Menses:   No menses with IUD in place, previously had regular, painful heavy menses.     Review Of Systems:  CONSTITUTIONAL: NEGATIVE for fever, chills  EYES: NEGATIVE for vision changes   RESP: NEGATIVE for significant cough or SOB  CV: NEGATIVE for chest pain, palpitations   GI: NEGATIVE for nausea, abdominal pain,  "heartburn, or change in bowel habits  : NEGATIVE for frequency, dysuria, or hematuria  MUSCULOSKELETAL: NEGATIVE for significant arthralgias or myalgia  NEURO: NEGATIVE for weakness, dizziness or paresthesias or headache    EXAM:  /78   Pulse 51   Ht 1.702 m (5' 7\")   Wt 64.4 kg (142 lb)   BMI 22.24 kg/m    Body mass index is 22.24 kg/m .    General - pleasant female in no acute distress.  Skin - no suspicious lesions or rashes  EENT-  PERRLA, euthyroid with out palpable nodules  Neck - supple without lymphadenopathy.  Breasts- symmetrical . No dominant fixed or suspicious masses noted.  No skin or nipple changes or axillary nodes.   Abdomen - soft, nontender, nondistended, no masses or organomegaly noted.  Musculoskeletal - no gross deformities.  Neurological - normal strength, sensation, and mental status.  Pelvic - EG: normal  female, vulva reveals no erythema or lesions.   BUS: within normal limits.  Vagina: well rugated, no lesions polyps or suspicious  discharge.     Cervix: no lesions, polyps discharge or CMT.  Uterus: firm,  anteverted, normal size and nontender.  Adnexa: no masses or tenderness.  Anus- normal, no lesions.  Rectovaginal - deferred.    ASSESSMENT/PLAN  Annual exam  Refill zoloft  Follow up 1 year or as needed    Di Richmond MD    PHQ9 score: 6  GAD7 score: 6        "

## 2024-07-22 ENCOUNTER — MYC MEDICAL ADVICE (OUTPATIENT)
Dept: OBGYN | Facility: CLINIC | Age: 34
End: 2024-07-22
Payer: COMMERCIAL

## 2024-07-22 DIAGNOSIS — F43.23 ADJUSTMENT DISORDER WITH MIXED ANXIETY AND DEPRESSED MOOD: ICD-10-CM

## 2025-01-15 DIAGNOSIS — F43.23 ADJUSTMENT DISORDER WITH MIXED ANXIETY AND DEPRESSED MOOD: ICD-10-CM

## 2025-01-16 ENCOUNTER — MYC MEDICAL ADVICE (OUTPATIENT)
Dept: OBGYN | Facility: CLINIC | Age: 35
End: 2025-01-16
Payer: COMMERCIAL

## 2025-01-16 RX ORDER — SERTRALINE HYDROCHLORIDE 100 MG/1
100 TABLET, FILM COATED ORAL DAILY
Qty: 90 TABLET | Refills: 0 | Status: SHIPPED | OUTPATIENT
Start: 2025-01-16

## 2025-01-16 NOTE — TELEPHONE ENCOUNTER
Refill request for sertraline received.     Pt last seen in clinic 1/22/2024 with Dr. Richmond who requested RTC in one year or PRN. No future appts scheduled at this time.     [email or web address] message sent to pt notifying of need for appt and refill sent to pharmacy.

## 2025-03-09 ENCOUNTER — HEALTH MAINTENANCE LETTER (OUTPATIENT)
Age: 35
End: 2025-03-09

## 2025-07-14 ENCOUNTER — MYC REFILL (OUTPATIENT)
Dept: OBGYN | Facility: CLINIC | Age: 35
End: 2025-07-14
Payer: COMMERCIAL

## 2025-07-14 DIAGNOSIS — F43.23 ADJUSTMENT DISORDER WITH MIXED ANXIETY AND DEPRESSED MOOD: ICD-10-CM

## 2025-07-15 RX ORDER — SERTRALINE HYDROCHLORIDE 100 MG/1
100 TABLET, FILM COATED ORAL DAILY
Qty: 90 TABLET | Refills: 0 | Status: SHIPPED | OUTPATIENT
Start: 2025-07-15

## (undated) DEVICE — PEN MARKING SKIN W/PAPER RULER 31145785

## (undated) DEVICE — PACK ACL SUPPLEMENT CUSTOM ASC

## (undated) DEVICE — LINEN ORTHO PACK 5446

## (undated) DEVICE — BLADE SAW OSCILLATING STRK MICRO 9X10X0.51MM 2296-033-220

## (undated) DEVICE — DRSG STERI STRIP 1/2X4" R1547

## (undated) DEVICE — Device

## (undated) DEVICE — ESU GROUND PAD ADULT W/CORD E7507

## (undated) DEVICE — PACK ARTHROSCOPY CUSTOM ASC

## (undated) DEVICE — PREP DURAPREP REMOVER 4OZ 8611

## (undated) DEVICE — SU VICRYL 2-0 CT-1 27" UND J259H

## (undated) DEVICE — ABLATOR ARTHREX APOLLO RF MP90 ASPIRATING 90DEG AR-9811

## (undated) DEVICE — BUR ARTHREX COOLCUT SABRE 4.0MMX13CM AR-8400SR

## (undated) DEVICE — PREP DURAPREP 26ML APL 8630

## (undated) DEVICE — ESU PENCIL SMOKE EVAC W/ROCKER SWITCH 0703-047-000

## (undated) DEVICE — TUBING SYSTEM ARTHREX PATIENT REDEUCE AR-6421

## (undated) DEVICE — GLOVE PROTEXIS BLUE W/NEU-THERA 8.0  2D73EB80

## (undated) DEVICE — SU FIBERWIRE 2 38"  AR-7200

## (undated) DEVICE — SU MONOCRYL 3-0 PS-1 27" Y936H

## (undated) DEVICE — SU ETHIBOND 1 CT-1 30" X425H

## (undated) DEVICE — SU LOOP #2 TIGERLOOP AR-7234T

## (undated) DEVICE — SOL NACL 0.9% IRRIG 3000ML BAG 2B7477

## (undated) DEVICE — GLOVE PROTEXIS POWDER FREE SMT 8.0  2D72PT80X

## (undated) DEVICE — PAD ARMBOARD FOAM EGGCRATE COVIDEN 3114367

## (undated) DEVICE — SUCTION MANIFOLD NEPTUNE 2 SYS 4 PORT 0702-020-000

## (undated) DEVICE — LINEN GOWN XLG 5407

## (undated) RX ORDER — OXYCODONE HYDROCHLORIDE 5 MG/1
TABLET ORAL
Status: DISPENSED
Start: 2020-02-04

## (undated) RX ORDER — FENTANYL CITRATE 50 UG/ML
INJECTION, SOLUTION INTRAMUSCULAR; INTRAVENOUS
Status: DISPENSED
Start: 2020-02-04

## (undated) RX ORDER — BUPIVACAINE HYDROCHLORIDE 2.5 MG/ML
INJECTION, SOLUTION EPIDURAL; INFILTRATION; INTRACAUDAL
Status: DISPENSED
Start: 2020-02-04

## (undated) RX ORDER — ACETAMINOPHEN 325 MG/1
TABLET ORAL
Status: DISPENSED
Start: 2020-02-04

## (undated) RX ORDER — EPINEPHRINE 1 MG/ML
INJECTION, SOLUTION, CONCENTRATE INTRAVENOUS
Status: DISPENSED
Start: 2020-02-04

## (undated) RX ORDER — CEFAZOLIN SODIUM 1 G/3ML
INJECTION, POWDER, FOR SOLUTION INTRAMUSCULAR; INTRAVENOUS
Status: DISPENSED
Start: 2020-02-04

## (undated) RX ORDER — LIDOCAINE HYDROCHLORIDE 10 MG/ML
INJECTION, SOLUTION EPIDURAL; INFILTRATION; INTRACAUDAL; PERINEURAL
Status: DISPENSED
Start: 2020-02-04

## (undated) RX ORDER — ONDANSETRON 2 MG/ML
INJECTION INTRAMUSCULAR; INTRAVENOUS
Status: DISPENSED
Start: 2020-02-04

## (undated) RX ORDER — KETOROLAC TROMETHAMINE 30 MG/ML
INJECTION, SOLUTION INTRAMUSCULAR; INTRAVENOUS
Status: DISPENSED
Start: 2020-02-04

## (undated) RX ORDER — PROPOFOL 10 MG/ML
INJECTION, EMULSION INTRAVENOUS
Status: DISPENSED
Start: 2020-02-04

## (undated) RX ORDER — LIDOCAINE HYDROCHLORIDE 20 MG/ML
INJECTION, SOLUTION EPIDURAL; INFILTRATION; INTRACAUDAL; PERINEURAL
Status: DISPENSED
Start: 2020-02-04

## (undated) RX ORDER — DEXAMETHASONE SODIUM PHOSPHATE 4 MG/ML
INJECTION, SOLUTION INTRA-ARTICULAR; INTRALESIONAL; INTRAMUSCULAR; INTRAVENOUS; SOFT TISSUE
Status: DISPENSED
Start: 2020-02-04